# Patient Record
Sex: FEMALE | Race: WHITE | NOT HISPANIC OR LATINO | URBAN - METROPOLITAN AREA
[De-identification: names, ages, dates, MRNs, and addresses within clinical notes are randomized per-mention and may not be internally consistent; named-entity substitution may affect disease eponyms.]

---

## 2021-02-10 ENCOUNTER — OFFICE (OUTPATIENT)
Dept: URBAN - METROPOLITAN AREA CLINIC 108 | Facility: CLINIC | Age: 51
End: 2021-02-10

## 2021-02-10 VITALS
HEART RATE: 78 BPM | SYSTOLIC BLOOD PRESSURE: 140 MMHG | WEIGHT: 101 LBS | HEIGHT: 60 IN | DIASTOLIC BLOOD PRESSURE: 80 MMHG

## 2021-02-10 DIAGNOSIS — R10.13 EPIGASTRIC PAIN: ICD-10-CM

## 2021-02-10 DIAGNOSIS — R63.4 ABNORMAL WEIGHT LOSS: ICD-10-CM

## 2021-02-10 DIAGNOSIS — R13.19 OTHER DYSPHAGIA: ICD-10-CM

## 2021-02-10 DIAGNOSIS — K21.9 GASTRO-ESOPHAGEAL REFLUX DISEASE WITHOUT ESOPHAGITIS: ICD-10-CM

## 2021-02-10 PROCEDURE — 99203 OFFICE O/P NEW LOW 30 MIN: CPT | Performed by: NURSE PRACTITIONER

## 2021-04-29 ENCOUNTER — OFFICE (OUTPATIENT)
Dept: URBAN - METROPOLITAN AREA CLINIC 106 | Facility: CLINIC | Age: 51
End: 2021-04-29

## 2021-04-29 VITALS
DIASTOLIC BLOOD PRESSURE: 64 MMHG | HEIGHT: 60 IN | SYSTOLIC BLOOD PRESSURE: 122 MMHG | HEART RATE: 67 BPM | TEMPERATURE: 97.1 F | OXYGEN SATURATION: 99 % | WEIGHT: 99 LBS

## 2021-04-29 DIAGNOSIS — R10.13 EPIGASTRIC PAIN: ICD-10-CM

## 2021-04-29 DIAGNOSIS — R63.4 ABNORMAL WEIGHT LOSS: ICD-10-CM

## 2021-04-29 DIAGNOSIS — K21.9 GASTRO-ESOPHAGEAL REFLUX DISEASE WITHOUT ESOPHAGITIS: ICD-10-CM

## 2021-04-29 DIAGNOSIS — R13.19 OTHER DYSPHAGIA: ICD-10-CM

## 2021-04-29 PROCEDURE — 99214 OFFICE O/P EST MOD 30 MIN: CPT

## 2021-04-29 NOTE — SERVICEHPINOTES
Ana Maria Hanley   is seen today for a follow-up visit.     She returns today stating reflux worsened while taking iron tablets x 15 days, she stopped those and continued omeprazole and famotidine, now better. She would like to wait on EGD and call us when she is ready.

## 2021-10-21 ENCOUNTER — OFFICE (OUTPATIENT)
Dept: URBAN - METROPOLITAN AREA CLINIC 72 | Facility: CLINIC | Age: 51
End: 2021-10-21

## 2021-10-21 VITALS
HEART RATE: 83 BPM | DIASTOLIC BLOOD PRESSURE: 62 MMHG | RESPIRATION RATE: 14 BRPM | HEIGHT: 60 IN | WEIGHT: 99 LBS | SYSTOLIC BLOOD PRESSURE: 110 MMHG | TEMPERATURE: 98.2 F

## 2021-10-21 DIAGNOSIS — F45.8 OTHER SOMATOFORM DISORDERS: ICD-10-CM

## 2021-10-21 DIAGNOSIS — R14.0 ABDOMINAL DISTENSION (GASEOUS): ICD-10-CM

## 2021-10-21 DIAGNOSIS — R63.4 ABNORMAL WEIGHT LOSS: ICD-10-CM

## 2021-10-21 DIAGNOSIS — D50.9 IRON DEFICIENCY ANEMIA, UNSPECIFIED: ICD-10-CM

## 2021-10-21 DIAGNOSIS — E55.9 VITAMIN D DEFICIENCY, UNSPECIFIED: ICD-10-CM

## 2021-10-21 DIAGNOSIS — M35.00 SJOGREN SYNDROME, UNSPECIFIED: ICD-10-CM

## 2021-10-21 DIAGNOSIS — E53.8 DEFICIENCY OF OTHER SPECIFIED B GROUP VITAMINS: ICD-10-CM

## 2021-10-21 PROCEDURE — 99214 OFFICE O/P EST MOD 30 MIN: CPT | Performed by: INTERNAL MEDICINE

## 2021-10-21 RX ORDER — SODIUM PICOSULFATE, MAGNESIUM OXIDE, AND ANHYDROUS CITRIC ACID 10; 3.5; 12 MG/160ML; G/160ML; G/160ML
LIQUID ORAL
Qty: 1 | Refills: 0 | Status: COMPLETED
Start: 2021-10-21 | End: 2022-01-31

## 2021-10-21 NOTE — SERVICEHPINOTES
Ana Maria Hanley   is seen today for a follow-up visit.  
parul teran   She initially saw Mt 2/2021 
br She reports beginning in September 2020, she had swelling in her neck and throat and acid reflux symptoms. In October 2020, that became severe and persistent on a daily basis. She would have acid reflux daily, she would have mid chest dysphagia daily. She would have episodes of epigastric and chest pain after eating. She saw Dr. Femi HAMMER. She also had salivary gland swelling. He ordered CT of her neck. He did a laryngoscopy and found signs of acid reflux. He treated her with omeprazole 20 mg daily and famotidine 40 mg daily. She's been on that about 12 weeks now. She is better. Her reflux is better. Her dysphagia is rare now. She did lose 30 pounds last year because she had changed to an autoimmune diet because of her Sjogren's. Since September, she has lost some more weight. She also reports history of H. pylori in 2005 and was treated. She also brings in a CT from 2010 which shows a right diaphragmatic hernia.   She subsequently saw her in ?4/2021 as reflux got worse after starting iron.  When she stopped iron and started ppi and famotidine symptoms improved.  She declined EGD. br
parul teran SHe has had 2 bouts of this iit was October to april last year.  feeling of throat swelling and trouble swolling and acid reflux.  That cleared with medication (prilosec 20 and famotinde).  Was dong really well. She remained on those then started iron and symtposm got really bad.  Stopped iron and increase to nexium 40 and famotine 40 at night.  She has some epigastric tenderness that feels like a tight knot or pulled muscle.   She also has a RUQ pain and an US showed a fulid filled gb.  It doesn't seem to be worse after eating but certain posititions do seem to make it worse.  The dysphagia issue is better no throat swelling.  Hardly ever has heartburn.  
parul teran She has been told that her b12 and vitamin D was low.   Her iron was also low.  She still has periods.  She lost quite a bit of weight in october when this started (10 lbs) she had gained it back but now lost it again.  She was diagnosed with Sjogrens in 2005.  
parul teran She also notes episodic extremem bloating. Narda nurse has reviewed and updated the medication list with the patient (medication reconciliation). I have also reviewed the medication list. New updates were made to the patient's medical, social and family history. Pertinent details are also noted above in the HPI.br visited="true"

## 2021-10-21 NOTE — SERVICENOTES
Our goal is to partner with you to improve your health and well being. It is important for you to complete necessary testing and follow the instructions given to you at your clinic visit. Our office will call you within 2 weeks with results of any testing but you may also call sooner to obtain results [phone].   If you have any questions or concerns please feel free to call.  We take your care very seriously and we thank you for your trust!
- we will obtain results of US done at [place]
- labs today
- schedule EGD and colonscopy
- get SIBO breath test
- continue nexium 40 mg once daily 30 min before breakfast
- continue famotidine at night before bed
- follow up after testing

## 2021-11-10 ENCOUNTER — AMBULATORY SURGICAL CENTER (OUTPATIENT)
Dept: URBAN - METROPOLITAN AREA SURGERY 19 | Facility: SURGERY | Age: 51
End: 2021-11-10

## 2021-11-10 ENCOUNTER — OFFICE (OUTPATIENT)
Dept: URBAN - METROPOLITAN AREA PATHOLOGY 24 | Facility: PATHOLOGY | Age: 51
End: 2021-11-10

## 2021-11-10 DIAGNOSIS — D50.9 IRON DEFICIENCY ANEMIA, UNSPECIFIED: ICD-10-CM

## 2021-11-10 DIAGNOSIS — R13.10 DYSPHAGIA, UNSPECIFIED: ICD-10-CM

## 2021-11-10 LAB
COLONOSCOPY STUDY: (no result)
COLONOSCOPY STUDY: (no result)
RELEVANT H&P ENDOSCOPY: (no result)
RELEVANT H&P ENDOSCOPY: (no result)

## 2021-11-10 PROCEDURE — 45378 DIAGNOSTIC COLONOSCOPY: CPT | Performed by: INTERNAL MEDICINE

## 2021-11-10 PROCEDURE — 88305 TISSUE EXAM BY PATHOLOGIST: CPT | Performed by: INTERNAL MEDICINE

## 2021-11-10 PROCEDURE — 43239 EGD BIOPSY SINGLE/MULTIPLE: CPT | Performed by: INTERNAL MEDICINE

## 2022-01-31 ENCOUNTER — OFFICE (OUTPATIENT)
Dept: URBAN - METROPOLITAN AREA CLINIC 72 | Facility: CLINIC | Age: 52
End: 2022-01-31

## 2022-01-31 VITALS
OXYGEN SATURATION: 100 % | HEART RATE: 77 BPM | SYSTOLIC BLOOD PRESSURE: 118 MMHG | DIASTOLIC BLOOD PRESSURE: 70 MMHG | WEIGHT: 105 LBS | HEIGHT: 60 IN

## 2022-01-31 DIAGNOSIS — R14.0 ABDOMINAL DISTENSION (GASEOUS): ICD-10-CM

## 2022-01-31 DIAGNOSIS — D50.9 IRON DEFICIENCY ANEMIA, UNSPECIFIED: ICD-10-CM

## 2022-01-31 DIAGNOSIS — R10.13 EPIGASTRIC PAIN: ICD-10-CM

## 2022-01-31 DIAGNOSIS — K58.9 IRRITABLE BOWEL SYNDROME WITHOUT DIARRHEA: ICD-10-CM

## 2022-01-31 DIAGNOSIS — K21.9 GASTRO-ESOPHAGEAL REFLUX DISEASE WITHOUT ESOPHAGITIS: ICD-10-CM

## 2022-01-31 PROCEDURE — 99214 OFFICE O/P EST MOD 30 MIN: CPT | Performed by: INTERNAL MEDICINE

## 2022-01-31 RX ORDER — FAMOTIDINE 20 MG/1
TABLET, FILM COATED ORAL
Qty: 90 | Refills: 3 | Status: ACTIVE
Start: 2022-01-31

## 2022-01-31 RX ORDER — RIFAXIMIN 550 MG/1
1650 TABLET ORAL
Qty: 42 | Refills: 0 | Status: ACTIVE
Start: 2022-01-31

## 2022-01-31 NOTE — SERVICENOTES
Our goal is to partner with you to improve your health and well being. It is important for you to complete necessary testing and follow the instructions given to you at your clinic visit. Our office will call you within 2 weeks with results of any testing but you may also call sooner to obtain results [phone].   If you have any questions or concerns please feel free to call.  We take your care very seriously and we thank you for your trust!
-  we will obtain results of the US
- continue vit D 1000 a day and I will let you know if you have to adjust
- stop famotidine 40 and decrease to 20 (new prescription in the pharmacy).  You can save the 40 to take as needed for breakthrough heartburn
- get labs
- , Take Xifaxan 1 pill three times a day for 14 days, if you have an issue with your pharmacy with this medication please let us know
- follow up in 3 months

## 2022-01-31 NOTE — SERVICEHPINOTES
Ana Maria Hanley   is seen today for a follow-up visit.  
parul Landin initially saw Mt 2/2021brSmagy reports beginning in September 2020, she had swelling in her neck and throat and acid reflux symptoms. In October 2020, that became severe and persistent on a daily basis. She would have acid reflux daily, she would have mid chest dysphagia daily. She would have episodes of epigastric and chest pain after eating. She saw Dr. Femi HAMMER. She also had salivary gland swelling. He ordered CT of her neck. He did a laryngoscopy and found signs of acid reflux. He treated her with omeprazole 20 mg daily and famotidine 40 mg daily. She's been on that about 12 weeks now. She is better. Her reflux is better. Her dysphagia is rare now. She did lose 30 pounds last year because she had changed to an autoimmune diet because of her Sjogren's. Since September, she has lost some more weight. She also reports history of H. pylori in 2005 and was treated. She also brings in a CT from 2010 which shows a right diaphragmatic hernia.She subsequently saw her in?4/2021 as reflux got worse after starting iron. When she stopped iron and started ppi and famotidine symptoms improved. She declined EGD. Interval History 10/2021brSMagy has had 2 bouts of this iit was October to april last year. feeling of throat swelling and trouble swelling and acid reflux. That cleared with medication (prilosec 20 and famotinde). Was dong really well. She remained on those then started iron and symptoms got really bad. Stopped iron and increase to nexium 40 and famotine 40 at night. She has some epigastric tenderness that feels like a tight knot or pulled muscle. She also has a RUQ pain and an US showed a fulid filled gb. It doesn't seem to be worse after eating but certain positions do seem to make it worse. The dysphagia issue is better no throat swelling. Hardly ever has heartburn. She has been told that her b12 and vitamin D was low. Her iron was also low. She still has periods. She lost quite a bit of weight in october when this started (10 lbs) she had gained it back but now lost it again. She was diagnosed with Sjogrens in 2005. She also notes episodic extreme bloating.    br  Plan from last visit:
br
br labs todaybr- schedule EGD and colonoscopybr- get SIBO breath testbr- continue nexium 40 mg once daily 30 min before breakfastbr- continue famotidine at night before bedbr- follow up after testing Interval History:  1/31/2022   
br   Labs show WILLIAM with sat 4%, ferritin of 3 as well as b12 deficiency to 133 and vitamin D deficiency to 24?
br I referred her to heme/onc, they recommended IV iron and she received a dose as well as oral b12. 
br She has also been getting b12 shots.  
br The epigastric pain is better still there, feels like a muscle or knot, pain to the right is improved. 
br She is still having a lot of bloating and tightness.  That is after eating, that is maybe a bit better. 
br She had one b12 shot last time with iron infusion.  Repeated b12 and told it was too low still so they are going to start every 2 weeks shot
br ALso told that WBC is a bit low so they are tracking that. 
brShe was constipated for quite a while and now that is a little better. 
br She is taking 40 and famotidine 40, she is not having much acid at all. br
br She is taking 1000 iu a day of vitamin D daily
br
br She had a pelvic US not too long again in October, That was through her GYN at [facility]. She was told that she had a large fibroid.  She follows Dr. Reyes, she did not have an endometrial biopsy
br
br weight has improved but stablebrMy nurse has reviewed and updated the medication list with the patient (medication reconciliation). I have also reviewed the medication list. New updates were made to the patient's medical, social and family history. Pertinent details are also noted above in the HPI.br visited="true"

## 2022-04-21 ENCOUNTER — OFFICE (OUTPATIENT)
Dept: URBAN - METROPOLITAN AREA CLINIC 72 | Facility: CLINIC | Age: 52
End: 2022-04-21

## 2022-04-21 VITALS
HEIGHT: 60 IN | OXYGEN SATURATION: 99 % | WEIGHT: 106 LBS | HEART RATE: 67 BPM | DIASTOLIC BLOOD PRESSURE: 68 MMHG | SYSTOLIC BLOOD PRESSURE: 118 MMHG

## 2022-04-21 DIAGNOSIS — R10.13 EPIGASTRIC PAIN: ICD-10-CM

## 2022-04-21 DIAGNOSIS — B96.81 HELICOBACTER PYLORI [H. PYLORI] AS THE CAUSE OF DISEASES CLA: ICD-10-CM

## 2022-04-21 PROCEDURE — 99214 OFFICE O/P EST MOD 30 MIN: CPT | Performed by: SPECIALIST

## 2022-05-12 LAB — H. PYLORI STOOL AG, EIA: NEGATIVE

## 2022-06-29 ENCOUNTER — OFFICE (OUTPATIENT)
Dept: URBAN - METROPOLITAN AREA CLINIC 84 | Facility: CLINIC | Age: 52
End: 2022-06-29

## 2022-06-29 VITALS
OXYGEN SATURATION: 99 % | WEIGHT: 101 LBS | SYSTOLIC BLOOD PRESSURE: 110 MMHG | HEART RATE: 68 BPM | HEIGHT: 60 IN | DIASTOLIC BLOOD PRESSURE: 78 MMHG

## 2022-06-29 DIAGNOSIS — R10.13 EPIGASTRIC PAIN: ICD-10-CM

## 2022-06-29 PROCEDURE — 99214 OFFICE O/P EST MOD 30 MIN: CPT | Performed by: SPECIALIST

## 2022-06-29 RX ORDER — ESOMEPRAZOLE MAGNESIUM 40 MG/1
CAPSULE, DELAYED RELEASE ORAL
Qty: 30 | Refills: 3 | Status: ACTIVE
Start: 2022-06-29

## 2022-09-12 ENCOUNTER — OFFICE (OUTPATIENT)
Dept: URBAN - METROPOLITAN AREA CLINIC 72 | Facility: CLINIC | Age: 52
End: 2022-09-12

## 2022-09-12 VITALS
DIASTOLIC BLOOD PRESSURE: 70 MMHG | SYSTOLIC BLOOD PRESSURE: 110 MMHG | HEART RATE: 66 BPM | OXYGEN SATURATION: 99 % | WEIGHT: 97 LBS | HEIGHT: 60 IN

## 2022-09-12 DIAGNOSIS — K21.9 GASTRO-ESOPHAGEAL REFLUX DISEASE WITHOUT ESOPHAGITIS: ICD-10-CM

## 2022-09-12 DIAGNOSIS — M35.00 SJOGREN SYNDROME, UNSPECIFIED: ICD-10-CM

## 2022-09-12 DIAGNOSIS — B96.81 HELICOBACTER PYLORI [H. PYLORI] AS THE CAUSE OF DISEASES CLA: ICD-10-CM

## 2022-09-12 DIAGNOSIS — E53.8 DEFICIENCY OF OTHER SPECIFIED B GROUP VITAMINS: ICD-10-CM

## 2022-09-12 DIAGNOSIS — E55.9 VITAMIN D DEFICIENCY, UNSPECIFIED: ICD-10-CM

## 2022-09-12 DIAGNOSIS — R63.4 ABNORMAL WEIGHT LOSS: ICD-10-CM

## 2022-09-12 DIAGNOSIS — R14.0 ABDOMINAL DISTENSION (GASEOUS): ICD-10-CM

## 2022-09-12 PROCEDURE — 99214 OFFICE O/P EST MOD 30 MIN: CPT | Performed by: INTERNAL MEDICINE

## 2022-09-12 RX ORDER — ESOMEPRAZOLE MAGNESIUM 20 MG/1
CAPSULE, DELAYED RELEASE ORAL
Qty: 90 | Refills: 3 | Status: ACTIVE
Start: 2022-09-12

## 2022-09-12 RX ORDER — SUCRALFATE 1 G/1
TABLET ORAL
Qty: 120 | Refills: 3 | Status: ACTIVE
Start: 2022-09-12

## 2022-09-12 NOTE — SERVICENOTES
Our goal is to partner with you to improve your health and well being. It is important for you to complete necessary testing and follow the instructions given to you at your clinic visit. Our office will call you within 2 weeks with results of any testing but you may also call sooner to obtain results [phone].   If you have any questions or concerns please feel free to call.  We take your care very seriously and we thank you for your trust!
- stop the nexium for at leats 4 days and repeat the stool test for HP 
- when you start back I would like to try the 20 mg dose and see how you do
- sánchez's tea is another good option for bloating
- trial of sucrualfate (carafate) 1-4 times a day. If the pills is too big you can make a slurry, place 1 sucralfate pill in about 1 ounce of water and let sit for 10 min then stir.  This will create a slurry, take it 2-4  times a day as needed for reflux, esophageal symptoms, upper abdominal pain..  Try to separate from other medications by 2 hours.
- please start vitamin D3 1000 iu a day
- try to add high calorie snacks like nut butter, avocado, protein drinks
- try restarting your pilocarpine as saliva helps neutralize acid, improves digestion
- follow up in 6 weeks

## 2022-09-12 NOTE — SERVICEHPINOTES
Ana Maria Hanley   is seen today for a follow-up visit.  
parul Landin initially saw Mt 2/2021brSmagy reports beginning in September 2020, she had swelling in her neck and throat and acid reflux symptoms. In October 2020, that became severe and persistent on a daily basis. She would have acid reflux daily, she would have mid chest dysphagia daily. She would have episodes of epigastric and chest pain after eating. She saw Dr. Femi HAMMER. She also had salivary gland swelling. He ordered CT of her neck. He did a laryngoscopy and found signs of acid reflux. He treated her with omeprazole 20 mg daily and famotidine 40 mg daily. She's been on that about 12 weeks now. She is better. Her reflux is better. Her dysphagia is rare now. She did lose 30 pounds last year because she had changed to an autoimmune diet because of her Sjogren's. Since September, she has lost some more weight. She also reports history of H. pylori in 2005 and was treated. She also brings in a CT from 2010 which shows a right diaphragmatic hernia.She subsequently saw her in?4/2021 as reflux got worse after starting iron. When she stopped iron and started ppi and famotidine symptoms improved. She declined EGD.Interval History 10/2021brSMagy has had 2 bouts of this iit was October to april last year. feeling of throat swelling and trouble swelling and acid reflux. That cleared with medication (prilosec 20 and famotinde). Was dong really well. She remained on those then started iron and symptoms got really bad. Stopped iron and increase to nexium 40 and famotine 40 at night. She has some epigastric tenderness that feels like a tight knot or pulled muscle. She also has a RUQ pain and an US showed a fulid filled gb. It doesn't seem to be worse after eating but certain positions do seem to make it worse. The dysphagia issue is better no throat swelling. Hardly ever has heartburn.She has been told that her b12 and vitamin D was low. Her iron was also low. She still has periods. She lost quite a bit of weight in october when this started (10 lbs) she had gained it back but now lost it again. She was diagnosed with Sjogrens in 2005.She also notes episodic extreme bloating.[place] from last visit:labs todaybr- schedule EGD and colonoscopybr- get SIBO breath testbr- continue nexium 40 mg once daily 30 min before breakfastbr- continue famotidine at night before bedbr- follow up after testingInterval History:1/31/2022brLabs show WILLIAM with sat 4%, ferritin of 3 as well as b12 deficiency to 133 and vitamin D deficiency to 24?Salomón referred her to heme/onc, they recommended IV iron and she received a dose as well as oral b12. Urvashi has also been getting b12 shots. brThe epigastric pain is better still there, feels like a muscle or knot, pain to the right is improved. Urvashi is still having a lot of bloating and tightness. That is after eating, that is maybe a bit better. Urvashi had one b12 shot last time with iron infusion. Repeated b12 and told it was too low still so they are going to start every 2 weeks shotbrALso told that WBC is a bit low so they are tracking that. Urvashi was constipated for quite a while and now that is a little better. Urvashi is taking 40 and famotidine 40, she is not having much acid at all. She is taking 1000 iu a day of vitamin D dailyShbecky had a pelvic US not too long again in October, That was through her GYN at [facility]. She was told that she had a large fibroid. She follows Dr. Reyes, she did not have an endometrial biopsyweight has improved but stable    br  
br She saw Dr. Hernández in 4/22 adn 6/22brhe HP was positives
br she was treated and follow up stool test was negative
br She was treated wtih xifaxin and did not feel that it helped. br She has also been seen by [place] [facility] for b12 injectionsbrShe was doing a lot better after the HP was treated and cleared.  Wasn't 100% so tried the xifaxin that seemed to make things worse.  Bloating and tightness in upper abdomen is worse, affects breathing and feels like it is pulling.  Some aching under ribs.  Started back on nexium and align in the last 10 weeks.  
br The nexium has helped the acid reflux symptoms.  She is still having some acid at times and upper abdominal pressure. 
br
br last week she had 2 episodes of blood in the stool and a bit when she wiped.  I saw picutre and it was streaked on the outside.  My nurse has reviewed and updated the medication list with the patient (medication reconciliation). I have also reviewed the medication list. New updates were made to the patient's medical, social and family history. Pertinent details are also noted above in the HPI.br visited="true"

## 2022-10-03 ENCOUNTER — APPOINTMENT (OUTPATIENT)
Dept: URBAN - METROPOLITAN AREA SURGERY 12 | Age: 52
Setting detail: DERMATOLOGY
End: 2022-10-03

## 2022-10-03 DIAGNOSIS — L81.4 OTHER MELANIN HYPERPIGMENTATION: ICD-10-CM

## 2022-10-03 DIAGNOSIS — L63.8 OTHER ALOPECIA AREATA: ICD-10-CM

## 2022-10-03 DIAGNOSIS — D18.0 HEMANGIOMA: ICD-10-CM

## 2022-10-03 DIAGNOSIS — L82.1 OTHER SEBORRHEIC KERATOSIS: ICD-10-CM

## 2022-10-03 DIAGNOSIS — D22 MELANOCYTIC NEVI: ICD-10-CM

## 2022-10-03 PROBLEM — D48.5 NEOPLASM OF UNCERTAIN BEHAVIOR OF SKIN: Status: ACTIVE | Noted: 2022-10-03

## 2022-10-03 PROBLEM — D18.01 HEMANGIOMA OF SKIN AND SUBCUTANEOUS TISSUE: Status: ACTIVE | Noted: 2022-10-03

## 2022-10-03 PROBLEM — D22.5 MELANOCYTIC NEVI OF TRUNK: Status: ACTIVE | Noted: 2022-10-03

## 2022-10-03 PROCEDURE — 11102 TANGNTL BX SKIN SINGLE LES: CPT

## 2022-10-03 PROCEDURE — 99203 OFFICE O/P NEW LOW 30 MIN: CPT | Mod: 25

## 2022-10-03 PROCEDURE — OTHER BIOPSY BY SHAVE METHOD: OTHER

## 2022-10-03 PROCEDURE — OTHER COUNSELING: OTHER

## 2022-10-03 ASSESSMENT — LOCATION SIMPLE DESCRIPTION DERM
LOCATION SIMPLE: ANTERIOR SCALP
LOCATION SIMPLE: CHEST
LOCATION SIMPLE: POSTERIOR NECK
LOCATION SIMPLE: ABDOMEN

## 2022-10-03 ASSESSMENT — LOCATION ZONE DERM
LOCATION ZONE: TRUNK
LOCATION ZONE: NECK
LOCATION ZONE: SCALP

## 2022-10-03 ASSESSMENT — LOCATION DETAILED DESCRIPTION DERM
LOCATION DETAILED: MIDDLE STERNUM
LOCATION DETAILED: XIPHOID
LOCATION DETAILED: LEFT INFERIOR POSTERIOR NECK
LOCATION DETAILED: MID-FRONTAL SCALP
LOCATION DETAILED: LOWER STERNUM

## 2022-10-03 NOTE — HPI: HAIR LOSS
How Severe Is Your Hair Loss?: mild
Additional History: Pt has had hx of hair loss in the past. Pt was recently dxd with H. Pylori. She states she noticed hair loss not long after

## 2022-10-03 NOTE — PROCEDURE: COUNSELING
Detail Level: Generalized
Detail Level: Zone
Patient Specific Counseling (Will Not Stick From Patient To Patient): Discussed [place] Kenalog but pt declined today. Will call back if she wishes to proceed

## 2022-10-31 ENCOUNTER — OFFICE (OUTPATIENT)
Dept: URBAN - METROPOLITAN AREA CLINIC 72 | Facility: CLINIC | Age: 52
End: 2022-10-31

## 2022-10-31 VITALS
OXYGEN SATURATION: 99 % | HEART RATE: 89 BPM | HEIGHT: 60 IN | SYSTOLIC BLOOD PRESSURE: 132 MMHG | DIASTOLIC BLOOD PRESSURE: 80 MMHG | WEIGHT: 100 LBS

## 2022-10-31 DIAGNOSIS — Z13.820 ENCOUNTER FOR SCREENING FOR OSTEOPOROSIS: ICD-10-CM

## 2022-10-31 DIAGNOSIS — D50.9 IRON DEFICIENCY ANEMIA, UNSPECIFIED: ICD-10-CM

## 2022-10-31 DIAGNOSIS — M35.00 SJOGREN SYNDROME, UNSPECIFIED: ICD-10-CM

## 2022-10-31 DIAGNOSIS — B96.81 HELICOBACTER PYLORI [H. PYLORI] AS THE CAUSE OF DISEASES CLA: ICD-10-CM

## 2022-10-31 DIAGNOSIS — E53.8 DEFICIENCY OF OTHER SPECIFIED B GROUP VITAMINS: ICD-10-CM

## 2022-10-31 DIAGNOSIS — R14.0 ABDOMINAL DISTENSION (GASEOUS): ICD-10-CM

## 2022-10-31 DIAGNOSIS — E55.9 VITAMIN D DEFICIENCY, UNSPECIFIED: ICD-10-CM

## 2022-10-31 PROCEDURE — 99214 OFFICE O/P EST MOD 30 MIN: CPT | Performed by: INTERNAL MEDICINE

## 2022-10-31 RX ORDER — SUCRALFATE 1 G/1
TABLET ORAL
Qty: 120 | Refills: 3 | Status: ACTIVE
Start: 2022-09-12

## 2022-10-31 NOTE — SERVICEHPINOTES
Ana Maria Hanley   is seen today for a follow-up visit. 
teresa initially saw Mt 2/2021brSmagy reports beginning in September 2020, she had swelling in her neck and throat and acid reflux symptoms. In October 2020, that became severe and persistent on a daily basis. She would have acid reflux daily, she would have mid chest dysphagia daily. She would have episodes of epigastric and chest pain after eating. She saw Dr. Femi HAMMER. She also had salivary gland swelling. He ordered CT of her neck. He did a laryngoscopy and found signs of acid reflux. He treated her with omeprazole 20 mg daily and famotidine 40 mg daily. She's been on that about 12 weeks now. She is better. Her reflux is better. Her dysphagia is rare now. She did lose 30 pounds last year because she had changed to an autoimmune diet because of her Sjogren's. Since September, she has lost some more weight. She also reports history of H. pylori in 2005 and was treated. She also brings in a CT from 2010 which shows a right diaphragmatic hernia.She subsequently saw her in?4/2021 as reflux got worse after starting iron. When she stopped iron and started ppi and famotidine symptoms improved. She declined EGD.Interval History 10/2021brSMagy has had 2 bouts of this iit was October to april last year. feeling of throat swelling and trouble swelling and acid reflux. That cleared with medication (prilosec 20 and famotinde). Was dong really well. She remained on those then started iron and symptoms got really bad. Stopped iron and increase to nexium 40 and famotine 40 at night. She has some epigastric tenderness that feels like a tight knot or pulled muscle. She also has a RUQ pain and an US showed a fulid filled gb. It doesn't seem to be worse after eating but certain positions do seem to make it worse. The dysphagia issue is better no throat swelling. Hardly ever has heartburn.She has been told that her b12 and vitamin D was low. Her iron was also low. She still has periods. She lost quite a bit of weight in october when this started (10 lbs) she had gained it back but now lost it again. She was diagnosed with Sjogrens in 2005.She also notes episodic extreme bloating.brInterval History:1/31/2022brLabs show WILLIAM with sat 4%, ferritin of 3 as well as b12 deficiency to 133 and vitamin D deficiency to 24?Salomón referred her to heme/onc, they recommended IV iron and she received a dose as well as oral b12.Urvashi has also been getting b12 shots.brThe epigastric pain is better still there, feels like a muscle or knot, pain to the right is improved.Urvashi is still having a lot of bloating and tightness. That is after eating, that is maybe a bit better.Urvashi had one b12 shot last time with iron infusion. Repeated b12 and told it was too low still so they are going to start every 2 weeks shotbrALso told that WBC is a bit low so they are tracking that.Urvashi was constipated for quite a while and now that is a little better.Urvashi is taking 40 and famotidine 40, she is not having much acid at all.She is taking 1000 iu a day of vitamin D dailyFrida had a pelvic US not too long again in October, That was through her GYN at [facility]. She was told that she had a large fibroid. She follows Dr. Reyes, she did not have an endometrial biopsyweight has improved but Robles saw Dr. Hernández in 4/22 adn 6/22brhe HP was positivesbrfrida was treated and follow up stool test was negativeUrvashi was treated wtih xifaxin and did not feel that it helped. Urvashi has also been seen by [place] [facility] for b12 injectionsbrFrida was doing a lot better after the HP was treated and cleared. Wasn't 100% so tried the xifaxin that seemed to make things worse. Bloating and tightness in upper abdomen is worse, affects breathing and feels like it is pulling. Some aching under ribs. Started back on nexium and align in the last 10 weeks. brThe nexium has helped the acid reflux symptoms. She is still having some acid at times and upper abdominal pressure. last week she had 2 episodes of blood in the stool and a bit when she wiped. I saw picutre and it was streaked on the outside.    br  Plan from last visit:
br
br- stop the nexium for at leats 4 days and repeat the stool test for HP br- when you start back I would like to try the 20 mg dose and see how you dobr- sánchez's tea is another good option for bloatingbr- trial of sucrualfate (carafate) 1-4 times a day. If the pills is too big you can make a slurry, place 1 sucralfate pill in about 1 ounce of water and let sit for 10 min then stir. This will create a slurry, take it 2-4 times a day as needed for reflux, esophageal symptoms, upper abdominal pain.. Try to separate from other medications by 2 hours.br- please start vitamin D3 1000 iu a daybr- try to add high calorie snacks like nut butter, avocado, protein drinksbr- try restarting your pilocarpine as saliva helps neutralize acid, improves digestionbr- follow up in 6 weeks Interval History:  10/31/2022   
br   She did not have the HP stool test done
br weight is stable or up a bit
br She delayed her HP stool
br She decreased marcos nexium from 40 to 20.  
br She is taking the carafate and felt like it was helping but the nexium seemed to be cuasing some lower abdominal pain and when she stopped it in th elast couple dayus she felt better. 
br 8/5 she had an iron infusion and she gets monthly b12 injections. 
br BM are normal. 
br swallowing is fine. 
br She is seeing her GYN 12/1, gyn was not worried about the fibroid in her uterus.  br
br My nurse has reviewed and updated the medication list with the patient (medication reconciliation). I have also reviewed the medication list. New updates were made to the patient's medical, social and family history. Pertinent details are also noted above in the HPI.br visited="true"

## 2022-10-31 NOTE — SERVICENOTES
Our goal is to partner with you to improve your health and well being. It is important for you to complete necessary testing and follow the instructions given to you at your clinic visit. Our office will call you within 2 weeks with results of any testing but you may also call sooner to obtain results [phone].   If you have any questions or concerns please feel free to call.  We take your care very seriously and we thank you for your trust!
- schedule dexa
- conitnue carafate up to 3 times a day.  Stop it for a day or two prior to collecting stool for HP

## 2023-05-30 ENCOUNTER — OFFICE (OUTPATIENT)
Dept: URBAN - METROPOLITAN AREA CLINIC 72 | Facility: CLINIC | Age: 53
End: 2023-05-30
Payer: COMMERCIAL

## 2023-05-30 VITALS
HEIGHT: 60 IN | WEIGHT: 116 LBS | HEART RATE: 55 BPM | DIASTOLIC BLOOD PRESSURE: 65 MMHG | SYSTOLIC BLOOD PRESSURE: 105 MMHG | OXYGEN SATURATION: 99 %

## 2023-05-30 DIAGNOSIS — R14.0 ABDOMINAL DISTENSION (GASEOUS): ICD-10-CM

## 2023-05-30 DIAGNOSIS — E55.9 VITAMIN D DEFICIENCY, UNSPECIFIED: ICD-10-CM

## 2023-05-30 DIAGNOSIS — E53.8 DEFICIENCY OF OTHER SPECIFIED B GROUP VITAMINS: ICD-10-CM

## 2023-05-30 DIAGNOSIS — K21.9 GASTRO-ESOPHAGEAL REFLUX DISEASE WITHOUT ESOPHAGITIS: ICD-10-CM

## 2023-05-30 DIAGNOSIS — Z80.0 FAMILY HISTORY OF MALIGNANT NEOPLASM OF DIGESTIVE ORGANS: ICD-10-CM

## 2023-05-30 PROCEDURE — 99214 OFFICE O/P EST MOD 30 MIN: CPT | Performed by: INTERNAL MEDICINE

## 2023-05-30 RX ORDER — FAMOTIDINE 40 MG/1
TABLET, FILM COATED ORAL
Qty: 90 | Refills: 3 | Status: COMPLETED
Start: 2023-05-30 | End: 2023-08-30

## 2023-05-30 RX ORDER — ERGOCALCIFEROL 1.25 MG/1
CAPSULE, LIQUID FILLED ORAL
Qty: 12 | Refills: 2 | Status: COMPLETED
Start: 2023-05-30 | End: 2023-08-30

## 2023-05-30 NOTE — SERVICENOTES
Our goal is to partner with you to improve your health and well being. It is important for you to complete necessary testing and follow the instructions given to you at your clinic visit. Our office will call you within 2 weeks with results of any testing but you may also call sooner to obtain results [phone].   If you have any questions or concerns please feel free to call.  We take your care very seriously and we thank you for your trust!]
- continue once a week 50,000 iu vitamin D
- when abdomen bloats try taking peppermint and FD guard (delia).  you can also take some gas-x and activate charcol if needed
- stool test
- we will get labs from your pcp (recent vitamin D)
- we will also request labs from [facility] (most recent) 
- famotidine 40 mg 1-2 times a day as needed for reflux symptoms

## 2023-05-30 NOTE — SERVICEHPINOTES
Ana Maria Hanley   is seen today for a follow-up visit.  
parul moore initially saw Mt 2/2021brSmagy reports beginning in September 2020, she had swelling in her neck and throat and acid reflux symptoms. In October 2020, that became severe and persistent on a daily basis. She would have acid reflux daily, she would have mid chest dysphagia daily. She would have episodes of epigastric and chest pain after eating. She saw Dr. Femi HAMMER. She also had salivary gland swelling. He ordered CT of her neck. He did a laryngoscopy and found signs of acid reflux. He treated her with omeprazole 20 mg daily and famotidine 40 mg daily. She's been on that about 12 weeks now. She is better. Her reflux is better. Her dysphagia is rare now. She did lose 30 pounds last year because she had changed to an autoimmune diet because of her Sjogren's. Since September, she has lost some more weight. She also reports history of H. pylori in 2005 and was treated. She also brings in a CT from 2010 which shows a right diaphragmatic hernia.She subsequently saw her in?4/2021 as reflux got worse after starting iron. When she stopped iron and started ppi and famotidine symptoms improved. She declined EGD.Interval History 10/2021brSMagy has had 2 bouts of this iit was October to april last year. feeling of throat swelling and trouble swelling and acid reflux. That cleared with medication (prilosec 20 and famotinde). Was dong really well. She remained on those then started iron and symptoms got really bad. Stopped iron and increase to nexium 40 and famotine 40 at night. She has some epigastric tenderness that feels like a tight knot or pulled muscle. She also has a RUQ pain and an US showed a fulid filled gb. It doesn't seem to be worse after eating but certain positions do seem to make it worse. The dysphagia issue is better no throat swelling. Hardly ever has heartburn.She has been told that her b12 and vitamin D was low. Her iron was also low. She still has periods. She lost quite a bit of weight in october when this started (10 lbs) she had gained it back but now lost it again. She was diagnosed with Sjogrens in 2005.She also notes episodic extreme bloating.brInterval History:1/31/2022brLabs show WILLIAM with sat 4%, ferritin of 3 as well as b12 deficiency to 133 and vitamin D deficiency to 24?[facility] referred her to heme/onc, they recommended IV iron and she received a dose as well as oral b12.Urvashi has also been getting b12 shots.brThe epigastric pain is better still there, feels like a muscle or knot, pain to the right is improved.Urvashi is still having a lot of bloating and tightness. That is after eating, that is maybe a bit better.Urvashi had one b12 shot last time with iron infusion. Repeated b12 and told it was too low still so they are going to start every 2 weeks shotbrALso told that WBC is a bit low so they are tracking that.Urvashi was constipated for quite a while and now that is a little better.Urvashi is taking 40 and famotidine 40, she is not having much acid at all.She is taking 1000 iu a day of vitamin D dailyFrida had a pelvic US not too long again in October, That was through her GYN at [facility]. She was told that she had a large fibroid. She follows Dr. Reyes, she did not have an endometrial biopsyweight has improved but Robles saw Dr. Hernández in 4/22 adn 6/22brhe HP was positivesbrshbecky was treated and follow up stool test was negativebrFrida was treated wtih xifaxin and did not feel that it helped.Urvashi has also been seen by [place] [facility] for b12 injectionsbrFrida was doing a lot better after the HP was treated and cleared. Wasn't 100% so tried the xifaxin that seemed to make things worse. Bloating and tightness in upper abdomen is worse, affects breathing and feels like it is pulling. Some aching under ribs. Started back on nexium and align in the last 10 weeks.brThe nexium has helped the acid reflux symptoms. She is still having some acid at times and upper abdominal pressure.last week she had 2 episodes of blood in the stool and a bit when she wiped. I saw picutre and it was streaked on the outside.brInterval History:10/31/2022brSmagy did not have the HP stool test donebrweight is stable or up a bitbrShe delayed her HP stoolbrShe decreased marcos nexium from 40 to 20. brShe is taking the carafate and felt like it was helping but the nexium seemed to be cuasing some lower abdominal pain and when she stopped it in th elast couple dayus she felt better. br8/5 she had an iron infusion and she gets monthly b12 injections. brBM are normal. brswallowing is fine. brShe is seeing her GYN 12/1, gyn was not worried about the fibroid in her uterus. br    br  Plan from last visit:
br- schedule dexabr- conitnue carafate up to 3 times a day. Stop it for a day or two prior to collecting stool for HP Interval History:  5/30/2023   
br   vit D low *17) started on weekly
br HP stool neg
br DExA wtih osteoporosis
br
br She has been off nexium for 3 months and feels like overall better. 
br She has intermittent bloating that is not as severe as when she had the H pylori, 
brHer vitamin D is in the normal range.  She has been on the 50,000 IU , SHe tried back on the OTC and had a little stomach pain. Occasionally she had a little acid.  br
She has had iron infusions nad monthly b12br She doesnt have a period since November.  
br She is also seeing rheumatologyMy nurse has reviewed and updated the medication list with the patient (medication reconciliation). I have also reviewed the medication list. New updates were made to the patient's medical, social and family history. Pertinent details are also noted above in the HPI.br visited="true"

## 2023-10-23 ENCOUNTER — OFFICE (OUTPATIENT)
Dept: URBAN - METROPOLITAN AREA CLINIC 72 | Facility: CLINIC | Age: 53
End: 2023-10-23

## 2023-10-23 VITALS
HEIGHT: 60 IN | WEIGHT: 116 LBS | DIASTOLIC BLOOD PRESSURE: 78 MMHG | HEART RATE: 66 BPM | SYSTOLIC BLOOD PRESSURE: 122 MMHG

## 2023-10-23 DIAGNOSIS — M35.00 SJOGREN SYNDROME, UNSPECIFIED: ICD-10-CM

## 2023-10-23 DIAGNOSIS — R10.13 EPIGASTRIC PAIN: ICD-10-CM

## 2023-10-23 DIAGNOSIS — M81.0 AGE-RELATED OSTEOPOROSIS WITHOUT CURRENT PATHOLOGICAL FRACTU: ICD-10-CM

## 2023-10-23 DIAGNOSIS — E55.9 VITAMIN D DEFICIENCY, UNSPECIFIED: ICD-10-CM

## 2023-10-23 DIAGNOSIS — R14.0 ABDOMINAL DISTENSION (GASEOUS): ICD-10-CM

## 2023-10-23 DIAGNOSIS — E53.8 DEFICIENCY OF OTHER SPECIFIED B GROUP VITAMINS: ICD-10-CM

## 2023-10-23 PROCEDURE — 99214 OFFICE O/P EST MOD 30 MIN: CPT | Performed by: INTERNAL MEDICINE

## 2023-10-23 RX ORDER — PREDNISONE 10 MG/1
TABLET ORAL
Qty: 25 | Refills: 1 | Status: ACTIVE
Start: 2023-10-23

## 2023-10-23 NOTE — SERVICEHPINOTES
Ana Maria Hanley   is seen today for a follow-up visit.  
brinitially saw Mt 2/2021brShe reports beginning in September 2020, she had swelling in her neck and throat and acid reflux symptoms. In October 2020, that became severe and persistent on a daily basis. She would have acid reflux daily, she would have mid chest dysphagia daily. She would have episodes of epigastric and chest pain after eating. She saw Dr. Femi HAMMER. She also had salivary gland swelling. He ordered CT of her neck. He did a laryngoscopy and found signs of acid reflux. He treated her with omeprazole 20 mg daily and famotidine 40 mg daily. She's been on that about 12 weeks now. She is better. Her reflux is better. Her dysphagia is rare now. She did lose 30 pounds last year because she had changed to an autoimmune diet because of her Sjogren's. Since September, she has lost some more weight. She also reports history of H. pylori in 2005 and was treated. She also brings in a CT from 2010 which shows a right diaphragmatic hernia.She subsequently saw her in?4/2021 as reflux got worse after starting iron. When she stopped iron and started ppi and famotidine symptoms improved. She declined EGD.Interval History 10/2021brSMagy has had 2 bouts of this iit was October to april last year. feeling of throat swelling and trouble swelling and acid reflux. That cleared with medication (prilosec 20 and famotinde). Was dong really well. She remained on those then started iron and symptoms got really bad. Stopped iron and increase to nexium 40 and famotine 40 at night. She has some epigastric tenderness that feels like a tight knot or pulled muscle. She also has a RUQ pain and an US showed a fulid filled gb. It doesn't seem to be worse after eating but certain positions do seem to make it worse. The dysphagia issue is better no throat swelling. Hardly ever has heartburn.She has been told that her b12 and vitamin D was low. Her iron was also low. She still has periods. She lost quite a bit of weight in october when this started (10 lbs) she had gained it back but now lost it again. She was diagnosed with Sjogrens in 2005.She also notes episodic extreme bloating.brInterval History:1/31/2022brLabs show WILLIAM with sat 4%, ferritin of 3 as well as b12 deficiency to 133 and vitamin D deficiency to 24?[facility] referred her to heme/onc, they recommended IV iron and she received a dose as well as oral b12.Urvashi has also been getting b12 shots.brThe epigastric pain is better still there, feels like a muscle or knot, pain to the right is improved.Urvashi is still having a lot of bloating and tightness. That is after eating, that is maybe a bit better.Urvashi had one b12 shot last time with iron infusion. Repeated b12 and told it was too low still so they are going to start every 2 weeks shotbrALso told that WBC is a bit low so they are tracking that.Urvashi was constipated for quite a while and now that is a little better.Urvashi is taking 40 and famotidine 40, she is not having much acid at all.She is taking 1000 iu a day of vitamin D dailyFrida had a pelvic US not too long again in October, That was through her GYN at [facility] [place]. She was told that she had a large fibroid. She follows Dr. Reyes, she did not have an endometrial biopsyweight has improved but Robles saw Dr. Hernández in 4/22 adn 6/22brhe HP was positivesbrshbecky was treated and follow up stool test was negativebrFrida was treated wtih xifaxin and did not feel that it helped.Urvashi has also been seen by [place] [facility] for b12 injectionsbrFrida was doing a lot better after the HP was treated and cleared. Wasn't 100% so tried the xifaxin that seemed to make things worse. Bloating and tightness in upper abdomen is worse, affects breathing and feels like it is pulling. Some aching under ribs. Started back on nexium and align in the last 10 weeks.brThe nexium has helped the acid reflux symptoms. She is still having some acid at times and upper abdominal pressure.last week she had 2 episodes of blood in the stool and a bit when she wiped. I saw picutre and it was streaked on the outside.brInterval History:10/31/2022brSmagy did not have the HP stool test donebrweight is stable or up a bitbrShe delayed her HP stoolbrShe decreased marcos nexium from 40 to 20.Urvashi is taking the carafate and felt like it was helping but the nexium seemed to be cuasing some lower abdominal pain and when she stopped it in th elast couple dayus she felt better.br8/5 she had an iron infusion and she gets monthly b12 injections.brBM are normal.brswallowing is fine.Urvashi is seeing her GYN 12/1, gyn was not worried about the fibroid in her uterus.brInterval History:5/30/2023brvit D low *17) started on weeklybrHP stool negbrDExA wtih osteoporosisShe has been off nexium for 3 months and feels like overall better. Urvashi has intermittent bloating that is not as severe as when she had the H pylori, brHer vitamin D is in the normal range. She has been on the 50,000 IU, SHe tried back on the OTC and had a little stomach pain. Occasionally she had a little acid. Urvashi has had iron infusions nad monthly [id number] doesnt have a period since November. Urvashi is also seeing rheumatologybr    br  Plan from last visit:
br- continue once a week 50,000 iu vitamin Dbr- when abdomen bloats try taking peppermint and FD guard (delia). you can also take some gas-x and activate charcol if neededbr- stool testbr- we will get labs from your pcp (recent vitamin D)br- we will also request labs from [facility] (most recent) br- famotidine 40 mg 1-2 times a day as needed for reflux symptoms Interval History:  10/23/2023   
br
br   vitamin D normal *64)
br cbc normal 
br
Urvashi was doing well without any GERD medications but then in August symtpoms got worse.  Burning in upper abdomen, chest and throat.  Bloating and tightness and pain.  She always feels like the epigastric area also flares up.   she started on famotidine and she felt like it was helping initially.  Then she felt like she  was getting worse.  She weaned off it.  
br
br She has been better but still some upper abdominal soreness.  Occasional soreness and tightness.  
br She has some increased urinary frequency and some right flank pain as well.  She was told taht WBC were increased 
br
br BM are normal.  
br
br She is taking vitamin D 50,000 every 10 days, monthly b12 shots. 
br
br Seh is noting some increased sjogrens symtpsom where she will flare and get glandular tenderness, more dryness, discomfort in her chest.  Steroid injections have helped her (she gets steroids with her iron infusions) an dthe impact generally lasts about a month.  She would like a taper thembr
 My nurse has reviewed and updated the medication list with the patient (medication reconciliation). I have also reviewed the medication list. New updates were made to the patient's medical, social and family history. Pertinent details are also noted above in the HPI.br visited="true"

## 2023-10-23 NOTE — SERVICENOTES
Our goal is to partner with you to improve your health and well being. It is important for you to complete necessary testing and follow the instructions given to you at your clinic visit. Our office will call you within 2 weeks with results of any testing but you may also call sooner to obtain results [phone].   If you have any questions or concerns please feel free to call.  We take your care very seriously and we thank you for your trust!
- - when abdomen bloats try taking peppermint and FD guard (delia). you can also try rachels tea
- If you have a flare up you can go back on the famotidine. You may want to take 20 mg a day while on the prednisone
-Prednisone 10 mg take 3 pills for 3 days, then 2 pills for 3 days then 1 pill for 3 days then 1/2 pills for 3 days then stop.
- schedule CT 
- , Follow up as needed if symptoms are not improving or you have new or concerning symptoms.

## 2024-12-23 ENCOUNTER — APPOINTMENT (OUTPATIENT)
Dept: URBAN - METROPOLITAN AREA SURGERY 12 | Age: 54
Setting detail: DERMATOLOGY
End: 2024-12-23

## 2024-12-23 DIAGNOSIS — L82.1 OTHER SEBORRHEIC KERATOSIS: ICD-10-CM

## 2024-12-23 DIAGNOSIS — D18.0 HEMANGIOMA: ICD-10-CM

## 2024-12-23 DIAGNOSIS — L30.4 ERYTHEMA INTERTRIGO: ICD-10-CM

## 2024-12-23 DIAGNOSIS — L81.4 OTHER MELANIN HYPERPIGMENTATION: ICD-10-CM

## 2024-12-23 DIAGNOSIS — D22 MELANOCYTIC NEVI: ICD-10-CM

## 2024-12-23 PROBLEM — D22.5 MELANOCYTIC NEVI OF TRUNK: Status: ACTIVE | Noted: 2024-12-23

## 2024-12-23 PROBLEM — D18.01 HEMANGIOMA OF SKIN AND SUBCUTANEOUS TISSUE: Status: ACTIVE | Noted: 2024-12-23

## 2024-12-23 PROCEDURE — OTHER PRESCRIPTION MEDICATION MANAGEMENT: OTHER

## 2024-12-23 PROCEDURE — OTHER COUNSELING: OTHER

## 2024-12-23 PROCEDURE — OTHER PRESCRIPTION: OTHER

## 2024-12-23 PROCEDURE — OTHER MEDICATION COUNSELING: OTHER

## 2024-12-23 PROCEDURE — 99213 OFFICE O/P EST LOW 20 MIN: CPT

## 2024-12-23 RX ORDER — DESONIDE 0.5 MG/G
CREAM TOPICAL
Qty: 15 | Refills: 2 | Status: ACTIVE

## 2024-12-23 RX ORDER — NYSTATIN 100000 [USP'U]/G
POWDER TOPICAL BID-TID
Qty: 60 | Refills: 3 | Status: ACTIVE

## 2024-12-23 ASSESSMENT — LOCATION SIMPLE DESCRIPTION DERM
LOCATION SIMPLE: LEFT BREAST
LOCATION SIMPLE: ABDOMEN
LOCATION SIMPLE: CHEST

## 2024-12-23 ASSESSMENT — LOCATION DETAILED DESCRIPTION DERM
LOCATION DETAILED: XIPHOID
LOCATION DETAILED: LEFT INFRAMAMMARY CREASE (INNER QUADRANT)
LOCATION DETAILED: MIDDLE STERNUM
LOCATION DETAILED: LOWER STERNUM

## 2024-12-23 ASSESSMENT — LOCATION ZONE DERM: LOCATION ZONE: TRUNK

## 2024-12-23 NOTE — PROCEDURE: MEDICATION COUNSELING
Finasteride Male Counseling: Finasteride Counseling:  I discussed with the patient the risks of use of finasteride including but not limited to decreased libido, decreased ejaculate volume, gynecomastia, and depression. Women should not handle medication.  All of the patient's questions and concerns were addressed.
Winlevi Counseling:  I discussed with the patient the risks of topical clascoterone including but not limited to erythema, scaling, itching, and stinging. Patient voiced their understanding.
Elidel Counseling: Patient may experience a mild burning sensation during topical application. Elidel is not approved in children less than 2 years of age. There have been case reports of hematologic and skin malignancies in patients using topical calcineurin inhibitors although causality is questionable.
Xeljanz Counseling: I discussed with the patient the risks of Xeljanz therapy including increased risk of infection, liver issues, headache, diarrhea, or cold symptoms. Live vaccines should be avoided. They were instructed to call if they have any problems.
Erivedge Counseling- I discussed with the patient the risks of Erivedge including but not limited to nausea, vomiting, diarrhea, constipation, weight loss, changes in the sense of taste, decreased appetite, muscle spasms, and hair loss.  The patient verbalized understanding of the proper use and possible adverse effects of Erivedge.  All of the patient's questions and concerns were addressed.
Azithromycin Counseling:  I discussed with the patient the risks of azithromycin including but not limited to GI upset, allergic reaction, drug rash, diarrhea, and yeast infections.
Doxepin Counseling:  Patient advised that the medication is sedating and not to drive a car after taking this medication. Patient informed of potential adverse effects including but not limited to dry mouth, urinary retention, and blurry vision.  The patient verbalized understanding of the proper use and possible adverse effects of doxepin.  All of the patient's questions and concerns were addressed.
Spevigo Pregnancy And Lactation Text: The risk during pregnancy and breastfeeding is uncertain with this medication. This medication does cross the placenta. It is unknown if this medication is found in breast milk.
Doxepin Pregnancy And Lactation Text: This medication is Pregnancy Category C and it isn't known if it is safe during pregnancy. It is also excreted in breast milk and breast feeding isn't recommended.
Stelara Counseling:  I discussed with the patient the risks of ustekinumab including but not limited to immunosuppression, malignancy, posterior leukoencephalopathy syndrome, and serious infections.  The patient understands that monitoring is required including a PPD at baseline and must alert us or the primary physician if symptoms of infection or other concerning signs are noted.
Itraconazole Counseling:  I discussed with the patient the risks of itraconazole including but not limited to liver damage, nausea/vomiting, neuropathy, and severe allergy.  The patient understands that this medication is best absorbed when taken with acidic beverages such as non-diet cola or ginger ale.  The patient understands that monitoring is required including baseline LFTs and repeat LFTs at intervals.  The patient understands that they are to contact us or the primary physician if concerning signs are noted.
Soolantra Counseling: I discussed with the patients the risks of topial Soolantra. This is a medicine which decreases the number of mites and inflammation in the skin. You experience burning, stinging, eye irritation or allergic reactions.  Please call our office if you develop any problems from using this medication.
Gabapentin Counseling: I discussed with the patient the risks of gabapentin including but not limited to dizziness, somnolence, fatigue and ataxia.
Cellcept Counseling:  I discussed with the patient the risks of mycophenolate mofetil including but not limited to infection/immunosuppression, GI upset, hypokalemia, hypercholesterolemia, bone marrow suppression, lymphoproliferative disorders, malignancy, GI ulceration/bleed/perforation, colitis, interstitial lung disease, kidney failure, progressive multifocal leukoencephalopathy, and birth defects.  The patient understands that monitoring is required including a baseline creatinine and regular CBC testing. In addition, patient must alert us immediately if symptoms of infection or other concerning signs are noted.
Rifampin Pregnancy And Lactation Text: This medication is Pregnancy Category C and it isn't know if it is safe during pregnancy. It is also excreted in breast milk and should not be used if you are breast feeding.
Zepbound Counseling: I reviewed the possible side effects including: thyroid tumors, kidney disease, gallbladder disease, abdominal pain, constipation, diarrhea, nausea, vomiting and pancreatitis. Do not take this medication if you have a history or family history of multiple endocrine neoplasia syndrome type 2. Side effects reviewed, pt to contact office should one occur.
Opioid Pregnancy And Lactation Text: These medications can lead to premature delivery and should be avoided during pregnancy. These medications are also present in breast milk in small amounts.
Hyrimoz Pregnancy And Lactation Text: This medication is Pregnancy Category B and is considered safe during pregnancy. It is unknown if this medication is excreted in breast milk.
High Dose Vitamin A Pregnancy And Lactation Text: High dose vitamin A therapy is contraindicated during pregnancy and breast feeding.
Use Enhanced Medication Counseling?: No
Sarecycline Counseling: Patient advised regarding possible photosensitivity and discoloration of the teeth, skin, lips, tongue and gums.  Patient instructed to avoid sunlight, if possible.  When exposed to sunlight, patients should wear protective clothing, sunglasses, and sunscreen.  The patient was instructed to call the office immediately if the following severe adverse effects occur:  hearing changes, easy bruising/bleeding, severe headache, or vision changes.  The patient verbalized understanding of the proper use and possible adverse effects of sarecycline.  All of the patient's questions and concerns were addressed.
Finasteride Female Counseling: Finasteride Counseling:  I discussed with the patient the risks of use of finasteride including but not limited to decreased libido and sexual dysfunction. Explained the teratogenic nature of the medication and stressed the importance of not getting pregnant during treatment. All of the patient's questions and concerns were addressed.
Ilumya Counseling: I discussed with the patient the risks of tildrakizumab including but not limited to immunosuppression, malignancy, posterior leukoencephalopathy syndrome, and serious infections.  The patient understands that monitoring is required including a PPD at baseline and must alert us or the primary physician if symptoms of infection or other concerning signs are noted.
Cellcept Pregnancy And Lactation Text: This medication is Pregnancy Category D and isn't considered safe during pregnancy. It is unknown if this medication is excreted in breast milk.
Elidel Pregnancy And Lactation Text: This medication is Pregnancy Category C. It is unknown if this medication is excreted in breast milk.
Winlevi Pregnancy And Lactation Text: This medication is considered safe during pregnancy and breastfeeding.
Erivedge Pregnancy And Lactation Text: This medication is Pregnancy Category X and is absolutely contraindicated during pregnancy. It is unknown if it is excreted in breast milk.
Azithromycin Pregnancy And Lactation Text: This medication is considered safe during pregnancy and is also secreted in breast milk.
Xeltitusz Pregnancy And Lactation Text: This medication is Pregnancy Category D and is not considered safe during pregnancy.  The risk during breast feeding is also uncertain.
Bactrim Counseling:  I discussed with the patient the risks of sulfa antibiotics including but not limited to GI upset, allergic reaction, drug rash, diarrhea, dizziness, photosensitivity, and yeast infections.  Rarely, more serious reactions can occur including but not limited to aplastic anemia, agranulocytosis, methemoglobinemia, blood dyscrasias, liver or kidney failure, lung infiltrates or desquamative/blistering drug rashes.
Libtayo Counseling- I discussed with the patient the risks of Libtayo including but not limited to nausea, vomiting, diarrhea, and bone or muscle pain.  The patient verbalized understanding of the proper use and possible adverse effects of Libtayo.  All of the patient's questions and concerns were addressed.
Itraconazole Pregnancy And Lactation Text: This medication is Pregnancy Category C and it isn't know if it is safe during pregnancy. It is also excreted in breast milk.
Soolantra Pregnancy And Lactation Text: This medication is Pregnancy Category C. This medication is considered safe during breast feeding.
Zepbound Pregnancy And Lactation Text: The fetal risk of this medication is unknown and taking while pregnant is not recommended. It is unknown if this medication is present in breast milk.
Gabapentin Pregnancy And Lactation Text: This medication is Pregnancy Category C and isn't considered safe during pregnancy. It is excreted in breast milk.
Hydroxyzine Counseling: Patient advised that the medication is sedating and not to drive a car after taking this medication.  Patient informed of potential adverse effects including but not limited to dry mouth, urinary retention, and blurry vision.  The patient verbalized understanding of the proper use and possible adverse effects of hydroxyzine.  All of the patient's questions and concerns were addressed.
Glycopyrrolate Counseling:  I discussed with the patient the risks of glycopyrrolate including but not limited to skin rash, drowsiness, dry mouth, difficulty urinating, and blurred vision.
Finasteride Pregnancy And Lactation Text: This medication is absolutely contraindicated during pregnancy. It is unknown if it is excreted in breast milk.
Ketoconazole Counseling:   Patient counseled regarding improving absorption with orange juice.  Adverse effects include but are not limited to breast enlargement, headache, diarrhea, nausea, upset stomach, liver function test abnormalities, taste disturbance, and stomach pain.  There is a rare possibility of liver failure that can occur when taking ketoconazole. The patient understands that monitoring of LFTs may be required, especially at baseline. The patient verbalized understanding of the proper use and possible adverse effects of ketoconazole.  All of the patient's questions and concerns were addressed.
Cyclophosphamide Counseling:  I discussed with the patient the risks of cyclophosphamide including but not limited to hair loss, hormonal abnormalities, decreased fertility, abdominal pain, diarrhea, nausea and vomiting, bone marrow suppression and infection. The patient understands that monitoring is required while taking this medication.
Eucrisa Counseling: Patient may experience a mild burning sensation during topical application. Eucrisa is not approved in children less than 3 months of age.
VTAMA Counseling: I discussed with the patient that VTAMA is not for use in the eyes, mouth or mouth. They should call the office if they develop any signs of allergic reactions to VTAMA. The patient verbalized understanding of the proper use and possible adverse effects of VTAMA.  All of the patient's questions and concerns were addressed.
Ilumya Pregnancy And Lactation Text: The risk during pregnancy and breastfeeding is uncertain with this medication.
Sarecycline Pregnancy And Lactation Text: This medication is Pregnancy Category D and not consider safe during pregnancy. It is also excreted in breast milk.
Vtama Pregnancy And Lactation Text: It is unknown if this medication can cause problems during pregnancy and breastfeeding.
Oral Minoxidil Counseling- I discussed with the patient the risks of oral minoxidil including but not limited to shortness of breath, swelling of the feet or ankles, dizziness, lightheadedness, unwanted hair growth and allergic reaction.  The patient verbalized understanding of the proper use and possible adverse effects of oral minoxidil.  All of the patient's questions and concerns were addressed.
Eucrisa Pregnancy And Lactation Text: This medication has not been assigned a Pregnancy Risk Category but animal studies failed to show danger with the topical medication. It is unknown if the medication is excreted in breast milk.
Libtayo Pregnancy And Lactation Text: This medication is contraindicated in pregnancy and when breast feeding.
Topical Retinoid counseling:  Patient advised to apply a pea-sized amount only at bedtime and wait 30 minutes after washing their face before applying.  If too drying, patient may add a non-comedogenic moisturizer. The patient verbalized understanding of the proper use and possible adverse effects of retinoids.  All of the patient's questions and concerns were addressed.
Hydroxyzine Pregnancy And Lactation Text: This medication is not safe during pregnancy and should not be taken. It is also excreted in breast milk and breast feeding isn't recommended.
Taltz Counseling: I discussed with the patient the risks of ixekizumab including but not limited to immunosuppression, serious infections, worsening of inflammatory bowel disease and drug reactions.  The patient understands that monitoring is required including a PPD at baseline and must alert us or the primary physician if symptoms of infection or other concerning signs are noted.
Bactrim Pregnancy And Lactation Text: This medication is Pregnancy Category D and is known to cause fetal risk.  It is also excreted in breast milk.
Glycopyrrolate Pregnancy And Lactation Text: This medication is Pregnancy Category B and is considered safe during pregnancy. It is unknown if it is excreted breast milk.
Ketoconazole Pregnancy And Lactation Text: This medication is Pregnancy Category C and it isn't know if it is safe during pregnancy. It is also excreted in breast milk and breast feeding isn't recommended.
Aklief counseling:  Patient advised to apply a pea-sized amount only at bedtime and wait 30 minutes after washing their face before applying.  If too drying, patient may add a non-comedogenic moisturizer.  The most commonly reported side effects including irritation, redness, scaling, dryness, stinging, burning, itching, and increased risk of sunburn.  The patient verbalized understanding of the proper use and possible adverse effects of retinoids.  All of the patient's questions and concerns were addressed.
Cyclophosphamide Pregnancy And Lactation Text: This medication is Pregnancy Category D and it isn't considered safe during pregnancy. This medication is excreted in breast milk.
Tetracycline Counseling: Patient counseled regarding possible photosensitivity and increased risk for sunburn.  Patient instructed to avoid sunlight, if possible.  When exposed to sunlight, patients should wear protective clothing, sunglasses, and sunscreen.  The patient was instructed to call the office immediately if the following severe adverse effects occur:  hearing changes, easy bruising/bleeding, severe headache, or vision changes.  The patient verbalized understanding of the proper use and possible adverse effects of tetracycline.  All of the patient's questions and concerns were addressed. Patient understands to avoid pregnancy while on therapy due to potential birth defects.
Infliximab Counseling:  I discussed with the patient the risks of infliximab including but not limited to myelosuppression, immunosuppression, autoimmune hepatitis, demyelinating diseases, lymphoma, and serious infections.  The patient understands that monitoring is required including a PPD at baseline and must alert us or the primary physician if symptoms of infection or other concerning signs are noted.
Birth Control Pills Counseling: Birth Control Pill Counseling: I discussed with the patient the potential side effects of OCPs including but not limited to increased risk of stroke, heart attack, thrombophlebitis, deep venous thrombosis, hepatic adenomas, breast changes, GI upset, headaches, and depression.  The patient verbalized understanding of the proper use and possible adverse effects of OCPs. All of the patient's questions and concerns were addressed.
Birth Control Pills Pregnancy And Lactation Text: This medication should be avoided if pregnant and for the first 30 days post-partum.
Oral Minoxidil Pregnancy And Lactation Text: This medication should only be used when clearly needed if you are pregnant, attempting to become pregnant or breast feeding.
Zoryve Counseling:  I discussed with the patient that Zoryve is not for use in the eyes, mouth or vagina. The most commonly reported side effects include diarrhea, headache, insomnia, application site pain, upper respiratory tract infections, and urinary tract infections.  All of the patient's questions and concerns were addressed.
Odomzo Counseling- I discussed with the patient the risks of Odomzo including but not limited to nausea, vomiting, diarrhea, constipation, weight loss, changes in the sense of taste, decreased appetite, muscle spasms, and hair loss.  The patient verbalized understanding of the proper use and possible adverse effects of Odomzo.  All of the patient's questions and concerns were addressed.
Cephalexin Counseling: I counseled the patient regarding use of cephalexin as an antibiotic for prophylactic and/or therapeutic purposes. Cephalexin (commonly prescribed under brand name Keflex) is a cephalosporin antibiotic which is active against numerous classes of bacteria, including most skin bacteria. Side effects may include nausea, diarrhea, gastrointestinal upset, rash, hives, yeast infections, and in rare cases, hepatitis, kidney disease, seizures, fever, confusion, neurologic symptoms, and others. Patients with severe allergies to penicillin medications are cautioned that there is about a 10% incidence of cross-reactivity with cephalosporins. When possible, patients with penicillin allergies should use alternatives to cephalosporins for antibiotic therapy.
Hydroquinone Counseling:  Patient advised that medication may result in skin irritation, lightening (hypopigmentation), dryness, and burning.  In the event of skin irritation, the patient was advised to reduce the amount of the drug applied or use it less frequently.  Rarely, spots that are treated with hydroquinone can become darker (pseudoochronosis).  Should this occur, patient instructed to stop medication and call the office. The patient verbalized understanding of the proper use and possible adverse effects of hydroquinone.  All of the patient's questions and concerns were addressed.
Bexarotene Counseling:  I discussed with the patient the risks of bexarotene including but not limited to hair loss, dry lips/skin/eyes, liver abnormalities, hyperlipidemia, pancreatitis, depression/suicidal ideation, photosensitivity, drug rash/allergic reactions, hypothyroidism, anemia, leukopenia, infection, cataracts, and teratogenicity.  Patient understands that they will need regular blood tests to check lipid profile, liver function tests, white blood cell count, thyroid function tests and pregnancy test if applicable.
Qbrexza Pregnancy And Lactation Text: There is no available data on Qbrexza use in pregnant women.  There is no available data on Qbrexza use in lactation.
Enbrel Counseling:  I discussed with the patient the risks of etanercept including but not limited to myelosuppression, immunosuppression, autoimmune hepatitis, demyelinating diseases, lymphoma, and infections.  The patient understands that monitoring is required including a PPD at baseline and must alert us or the primary physician if symptoms of infection or other concerning signs are noted.
Detail Level: Simple
Olumiant Pregnancy And Lactation Text: Based on animal studies, Olumiant may cause embryo-fetal harm when administered to pregnant women.  The medication should not be used in pregnancy.  Breastfeeding is not recommended during treatment.
Cantharidin Counseling:  I discussed with the patient the risks of Cantharidin including but not limited to pain, redness, burning, itching, and blistering.
Topical Steroids Applications Pregnancy And Lactation Text: Most topical steroids are considered safe to use during pregnancy and lactation.  Any topical steroid applied to the breast or nipple should be washed off before breastfeeding.
Cimzia Pregnancy And Lactation Text: This medication crosses the placenta but can be considered safe in certain situations. Cimzia may be excreted in breast milk.
Minocycline Counseling: Patient advised regarding possible photosensitivity and discoloration of the teeth, skin, lips, tongue and gums.  Patient instructed to avoid sunlight, if possible.  When exposed to sunlight, patients should wear protective clothing, sunglasses, and sunscreen.  The patient was instructed to call the office immediately if the following severe adverse effects occur:  hearing changes, easy bruising/bleeding, severe headache, or vision changes.  The patient verbalized understanding of the proper use and possible adverse effects of minocycline.  All of the patient's questions and concerns were addressed.
Topical Sulfur Applications Counseling: Topical Sulfur Counseling: Patient counseled that this medication may cause skin irritation or allergic reactions.  In the event of skin irritation, the patient was advised to reduce the amount of the drug applied or use it less frequently.   The patient verbalized understanding of the proper use and possible adverse effects of topical sulfur application.  All of the patient's questions and concerns were addressed.
Albendazole Pregnancy And Lactation Text: This medication is Pregnancy Category C and it isn't known if it is safe during pregnancy. It is also excreted in breast milk.
5-Fu Counseling: 5-Fluorouracil Counseling:  I discussed with the patient the risks of 5-fluorouracil including but not limited to erythema, scaling, itching, weeping, crusting, and pain.
Rinvoq Counseling: I discussed with the patient the risks of Rinvoq therapy including but not limited to upper respiratory tract infections, shingles, cold sores, bronchitis, nausea, cough, fever, acne, and headache. Live vaccines should be avoided.  This medication has been linked to serious infections; higher rate of mortality; malignancy and lymphoproliferative disorders; major adverse cardiovascular events; thrombosis; thrombocytopenia, anemia, and neutropenia; lipid elevations; liver enzyme elevations; and gastrointestinal perforations.
Methotrexate Pregnancy And Lactation Text: This medication is Pregnancy Category X and is known to cause fetal harm. This medication is excreted in breast milk.
Skyrizi Counseling: I discussed with the patient the risks of risankizumab-rzaa including but not limited to immunosuppression, and serious infections.  The patient understands that monitoring is required including a PPD at baseline and must alert us or the primary physician if symptoms of infection or other concerning signs are noted.
Tranexamic Acid Counseling:  Patient advised of the small risk of bleeding problems with tranexamic acid. They were also instructed to call if they developed any nausea, vomiting or diarrhea. All of the patient's questions and concerns were addressed.
Rhofade Counseling: Rhofade is a topical medication which can decrease superficial blood flow where applied. Side effects are uncommon and include stinging, redness and allergic reactions.
Fluconazole Counseling:  Patient counseled regarding adverse effects of fluconazole including but not limited to headache, diarrhea, nausea, upset stomach, liver function test abnormalities, taste disturbance, and stomach pain.  There is a rare possibility of liver failure that can occur when taking fluconazole.  The patient understands that monitoring of LFTs and kidney function test may be required, especially at baseline. The patient verbalized understanding of the proper use and possible adverse effects of fluconazole.  All of the patient's questions and concerns were addressed.
Colchicine Counseling:  Patient counseled regarding adverse effects including but not limited to stomach upset (nausea, vomiting, stomach pain, or diarrhea).  Patient instructed to limit alcohol consumption while taking this medication.  Colchicine may reduce blood counts especially with prolonged use.  The patient understands that monitoring of kidney function and blood counts may be required, especially at baseline. The patient verbalized understanding of the proper use and possible adverse effects of colchicine.  All of the patient's questions and concerns were addressed.
Semaglutide Counseling: I reviewed the possible side effects including: thyroid tumors, kidney disease, gallbladder disease, abdominal pain, constipation, diarrhea, nausea, vomiting and pancreatitis. Do not take this medication if you have a history or family history of multiple endocrine neoplasia syndrome type 2. Side effects reviewed, pt to contact office should one occur.
Bexarotene Pregnancy And Lactation Text: This medication is Pregnancy Category X and should not be given to women who are pregnant or may become pregnant. This medication should not be used if you are breast feeding.
Dutasteride Male Counseling: Dustasteride Counseling:  I discussed with the patient the risks of use of dutasteride including but not limited to decreased libido, decreased ejaculate volume, and gynecomastia. Women who can become pregnant should not handle medication.  All of the patient's questions and concerns were addressed.
Humira Counseling:  I discussed with the patient the risks of adalimumab including but not limited to myelosuppression, immunosuppression, autoimmune hepatitis, demyelinating diseases, lymphoma, and serious infections.  The patient understands that monitoring is required including a PPD at baseline and must alert us or the primary physician if symptoms of infection or other concerning signs are noted.
Quinolones Counseling:  I discussed with the patient the risks of fluoroquinolones including but not limited to GI upset, allergic reaction, drug rash, diarrhea, dizziness, photosensitivity, yeast infections, liver function test abnormalities, tendonitis/tendon rupture.
Isotretinoin Counseling: Patient should get monthly blood tests, not donate blood, not drive at night if vision affected, not share medication, and not undergo elective surgery for 6 months after tx completed. Side effects reviewed, pt to contact office should one occur.
Topical Sulfur Applications Pregnancy And Lactation Text: This medication is considered safe during pregnancy and breast feeding secondary to limited systemic absorption.
Ivermectin Counseling:  Patient instructed to take medication on an empty stomach with a full glass of water.  Patient informed of potential adverse effects including but not limited to nausea, diarrhea, dizziness, itching, and swelling of the extremities or lymph nodes.  The patient verbalized understanding of the proper use and possible adverse effects of ivermectin.  All of the patient's questions and concerns were addressed.
5-Fu Pregnancy And Lactation Text: This medication is Pregnancy Category X and contraindicated in pregnancy and in women who may become pregnant. It is unknown if this medication is excreted in breast milk.
Rinvoq Pregnancy And Lactation Text: Based on animal studies, Rinvoq may cause embryo-fetal harm when administered to pregnant women.  The medication should not be used in pregnancy.  Breastfeeding is not recommended during treatment and for 6 days after the last dose.
Prednisone Counseling:  I discussed with the patient the risks of prolonged use of prednisone including but not limited to weight gain, insomnia, osteoporosis, mood changes, diabetes, susceptibility to infection, glaucoma and high blood pressure.  In cases where prednisone use is prolonged, patients should be monitored with blood pressure checks, serum glucose levels and an eye exam.  Additionally, the patient may need to be placed on GI prophylaxis, PCP prophylaxis, and calcium and vitamin D supplementation and/or a bisphosphonate.  The patient verbalized understanding of the proper use and the possible adverse effects of prednisone.  All of the patient's questions and concerns were addressed.
Cimetidine Counseling:  I discussed with the patient the risks of Cimetidine including but not limited to gynecomastia, headache, diarrhea, nausea, drowsiness, arrhythmias, pancreatitis, skin rashes, psychosis, bone marrow suppression and kidney toxicity.
Rhofade Pregnancy And Lactation Text: This medication has not been assigned a Pregnancy Risk Category. It is unknown if the medication is excreted in breast milk.
Tranexamic Acid Pregnancy And Lactation Text: It is unknown if this medication is safe during pregnancy or breast feeding.
Isotretinoin Pregnancy And Lactation Text: This medication is Pregnancy Category X and is considered extremely dangerous during pregnancy. It is unknown if it is excreted in breast milk.
Valtrex Counseling: I discussed with the patient the risks of valacyclovir including but not limited to kidney damage, nausea, vomiting and severe allergy.  The patient understands that if the infection seems to be worsening or is not improving, they are to call.
Griseofulvin Counseling:  I discussed with the patient the risks of griseofulvin including but not limited to photosensitivity, cytopenia, liver damage, nausea/vomiting and severe allergy.  The patient understands that this medication is best absorbed when taken with a fatty meal (e.g., ice cream or french fries).
Wartpeel Counseling:  I discussed with the patient the risks of Wartpeel including but not limited to erythema, scaling, itching, weeping, crusting, and pain.
Sotyktu Counseling:  I discussed the most common side effects of Sotyktu including: common cold, sore throat, sinus infections, cold sores, canker sores, folliculitis, and acne.  I also discussed more serious side effects of Sotyktu including but not limited to: serious allergic reactions; increased risk for infections such as TB; cancers such as lymphomas; rhabdomyolysis and elevated CPK; and elevated triglycerides and liver enzymes. 
Drysol Counseling:  I discussed with the patient the risks of drysol/aluminum chloride including but not limited to skin rash, itching, irritation, burning.
Dutasteride Female Counseling: Dutasteride Counseling:  I discussed with the patient the risks of use of dutasteride including but not limited to decreased libido and sexual dysfunction. Explained the teratogenic nature of the medication and stressed the importance of not getting pregnant during treatment. All of the patient's questions and concerns were addressed.
Prednisone Pregnancy And Lactation Text: This medication is Pregnancy Category C and it isn't know if it is safe during pregnancy. This medication is excreted in breast milk.
Spevigo Counseling: I discussed with the patient the risks of Spevigo including but not limited to fatigue, nasuea, vomiting, headache, pruritus, urinary tract infection, an infusion related reactions.  The patient understands that monitoring is required including screening for tuberculosis at baseline and yearly screening thereafter while continuing Spevigo therapy. They should contact us if symptoms of infection or other concerning signs are noted.
Drysol Pregnancy And Lactation Text: This medication is considered safe during pregnancy and breast feeding.
Solaraze Counseling:  I discussed with the patient the risks of Solaraze including but not limited to erythema, scaling, itching, weeping, crusting, and pain.
Dapsone Counseling: I discussed with the patient the risks of dapsone including but not limited to hemolytic anemia, agranulocytosis, rashes, methemoglobinemia, kidney failure, peripheral neuropathy, headaches, GI upset, and liver toxicity.  Patients who start dapsone require monitoring including baseline LFTs and weekly CBCs for the first month, then every month thereafter.  The patient verbalized understanding of the proper use and possible adverse effects of dapsone.  All of the patient's questions and concerns were addressed.
Wegovy Counseling: I reviewed the possible side effects including: thyroid tumors, kidney disease, gallbladder disease, abdominal pain, constipation, diarrhea, nausea, vomiting and pancreatitis. Do not take this medication if you have a history or family history of multiple endocrine neoplasia syndrome type 2. Side effects reviewed, pt to contact office should one occur.
Azathioprine Counseling:  I discussed with the patient the risks of azathioprine including but not limited to myelosuppression, immunosuppression, hepatotoxicity, lymphoma, and infections.  The patient understands that monitoring is required including baseline LFTs, Creatinine, possible TPMP genotyping and weekly CBCs for the first month and then every 2 weeks thereafter.  The patient verbalized understanding of the proper use and possible adverse effects of azathioprine.  All of the patient's questions and concerns were addressed.
Cimetidine Pregnancy And Lactation Text: This medication is Pregnancy Category B and is considered safe during pregnancy. It is also excreted in breast milk and breast feeding isn't recommended.
Dapsone Pregnancy And Lactation Text: This medication is Pregnancy Category C and is not considered safe during pregnancy or breast feeding.
Valtrex Pregnancy And Lactation Text: this medication is Pregnancy Category B and is considered safe during pregnancy. This medication is not directly found in breast milk but it's metabolite acyclovir is present.
Griseofulvin Pregnancy And Lactation Text: This medication is Pregnancy Category X and is known to cause serious birth defects. It is unknown if this medication is excreted in breast milk but breast feeding should be avoided.
Solaraze Pregnancy And Lactation Text: This medication is Pregnancy Category B and is considered safe. There is some data to suggest avoiding during the third trimester. It is unknown if this medication is excreted in breast milk.
Hyrimoz Counseling:  I discussed with the patient the risks of adalimumab including but not limited to myelosuppression, immunosuppression, autoimmune hepatitis, demyelinating diseases, lymphoma, and serious infections.  The patient understands that monitoring is required including a PPD at baseline and must alert us or the primary physician if symptoms of infection or other concerning signs are noted.
Sotyktu Pregnancy And Lactation Text: There is insufficient data to evaluate whether or not Sotyktu is safe to use during pregnancy.   It is not known if Sotyktu passes into breast milk and whether or not it is safe to use when breastfeeding.  
Opioid Counseling: I discussed with the patient the potential side effects of opioids including but not limited to addiction, altered mental status, and depression. I stressed avoiding alcohol, benzodiazepines, muscle relaxants and sleep aids unless specifically okayed by a physician. The patient verbalized understanding of the proper use and possible adverse effects of opioids. All of the patient's questions and concerns were addressed. They were instructed to flush the remaining pills down the toilet if they did not need them for pain.
Rifampin Counseling: I discussed with the patient the risks of rifampin including but not limited to liver damage, kidney damage, red-orange body fluids, nausea/vomiting and severe allergy.
Dutasteride Pregnancy And Lactation Text: This medication is absolutely contraindicated in women, especially during pregnancy and breast feeding. Feminization of male fetuses is possible if taking while pregnant.
High Dose Vitamin A Counseling: Side effects reviewed, pt to contact office should one occur.
Picato Counseling:  I discussed with the patient the risks of Picato including but not limited to erythema, scaling, itching, weeping, crusting, and pain.
Siliq Counseling:  I discussed with the patient the risks of Siliq including but not limited to new or worsening depression, suicidal thoughts and behavior, immunosuppression, malignancy, posterior leukoencephalopathy syndrome, and serious infections.  The patient understands that monitoring is required including a PPD at baseline and must alert us or the primary physician if symptoms of infection or other concerning signs are noted. There is also a special program designed to monitor depression which is required with Siliq.
Klisyri Pregnancy And Lactation Text: It is unknown if this medication can harm a developing fetus or if it is excreted in breast milk.
Propranolol Counseling:  I discussed with the patient the risks of propranolol including but not limited to low heart rate, low blood pressure, low blood sugar, restlessness and increased cold sensitivity. They should call the office if they experience any of these side effects.
Minoxidil Counseling: Minoxidil is a topical medication which can increase blood flow where it is applied. It is uncertain how this medication increases hair growth. Side effects are uncommon and include stinging and allergic reactions.
Propranolol Pregnancy And Lactation Text: This medication is Pregnancy Category C and it isn't known if it is safe during pregnancy. It is excreted in breast milk.
Topical Ketoconazole Pregnancy And Lactation Text: This medication is Pregnancy Category B and is considered safe during pregnancy. It is unknown if it is excreted in breast milk.
Cibinqo Pregnancy And Lactation Text: It is unknown if this medication will adversely affect pregnancy or breast feeding.  You should not take this medication if you are currently pregnant or planning a pregnancy or while breastfeeding.
Niacinamide Pregnancy And Lactation Text: These medications are considered safe during pregnancy.
Carac Counseling:  I discussed with the patient the risks of Carac including but not limited to erythema, scaling, itching, weeping, crusting, and pain.
Adbry Pregnancy And Lactation Text: It is unknown if this medication will adversely affect pregnancy or breast feeding.
Dupixent Counseling: I discussed with the patient the risks of dupilumab including but not limited to eye infection and irritation, cold sores, injection site reactions, worsening of asthma, allergic reactions and increased risk of parasitic infection.  Live vaccines should be avoided while taking dupilumab. Dupilumab will also interact with certain medications such as warfarin and cyclosporine. The patient understands that monitoring is required and they must alert us or the primary physician if symptoms of infection or other concerning signs are noted.
Erythromycin Counseling:  I discussed with the patient the risks of erythromycin including but not limited to GI upset, allergic reaction, drug rash, diarrhea, increase in liver enzymes, and yeast infections.
Nsaids Counseling: NSAID Counseling: I discussed with the patient that NSAIDs should be taken with food. Prolonged use of NSAIDs can result in the development of stomach ulcers.  Patient advised to stop taking NSAIDs if abdominal pain occurs.  The patient verbalized understanding of the proper use and possible adverse effects of NSAIDs.  All of the patient's questions and concerns were addressed.
Topical Metronidazole Counseling: Metronidazole is a topical antibiotic medication. You may experience burning, stinging, redness, or allergic reactions.  Please call our office if you develop any problems from using this medication.
Litfulo Counseling: I discussed with the patient the risks of Litfulo therapy including but not limited to upper respiratory tract infections, shingles, cold sores, and nausea. Live vaccines should be avoided.  This medication has been linked to serious infections; higher rate of mortality; malignancy and lymphoproliferative disorders; major adverse cardiovascular events; thrombosis; gastrointestinal perforations; neutropenia; lymphopenia; anemia; liver enzyme elevations; and lipid elevations.
SSKI Counseling:  I discussed with the patient the risks of SSKI including but not limited to thyroid abnormalities, metallic taste, GI upset, fever, headache, acne, arthralgias, paraesthesias, lymphadenopathy, easy bleeding, arrhythmias, and allergic reaction.
Arava Counseling:  Patient counseled regarding adverse effects of Arava including but not limited to nausea, vomiting, abnormalities in liver function tests. Patients may develop mouth sores, rash, diarrhea, and abnormalities in blood counts. The patient understands that monitoring is required including LFTs and blood counts.  There is a rare possibility of scarring of the liver and lung problems that can occur when taking methotrexate. Persistent nausea, loss of appetite, pale stools, dark urine, cough, and shortness of breath should be reported immediately. Patient advised to discontinue Arava treatment and consult with a physician prior to attempting conception. The patient will have to undergo a treatment to eliminate Arava from the body prior to conception.
Protopic Counseling: Patient may experience a mild burning sensation during topical application. Protopic is not approved in children less than 2 years of age. There have been case reports of hematologic and skin malignancies in patients using topical calcineurin inhibitors although causality is questionable.
Ozempic Counseling: I reviewed the possible side effects including: thyroid tumors, kidney disease, gallbladder disease, abdominal pain, constipation, diarrhea, nausea, vomiting and pancreatitis. Do not take this medication if you have a history or family history of multiple endocrine neoplasia syndrome type 2. Side effects reviewed, pt to contact office should one occur.
Erythromycin Pregnancy And Lactation Text: This medication is Pregnancy Category B and is considered safe during pregnancy. It is also excreted in breast milk.
Bimzelx Counseling:  I discussed with the patient the risks of Bimzelx including but not limited to depression, immunosuppression, allergic reactions and infections.  The patient understands that monitoring is required including a PPD at baseline and must alert us or the primary physician if symptoms of infection or other concerning signs are noted.
Dupixent Pregnancy And Lactation Text: This medication likely crosses the placenta but the risk for the fetus is uncertain. This medication is excreted in breast milk.
Acitretin Counseling:  I discussed with the patient the risks of acitretin including but not limited to hair loss, dry lips/skin/eyes, liver damage, hyperlipidemia, depression/suicidal ideation, photosensitivity.  Serious rare side effects can include but are not limited to pancreatitis, pseudotumor cerebri, bony changes, clot formation/stroke/heart attack.  Patient understands that alcohol is contraindicated since it can result in liver toxicity and significantly prolong the elimination of the drug by many years.
Topical Metronidazole Pregnancy And Lactation Text: This medication is Pregnancy Category B and considered safe during pregnancy.  It is also considered safe to use while breastfeeding.
Bimzelx Pregnancy And Lactation Text: This medication crosses the placenta and the safety is uncertain during pregnancy. It is unknown if this medication is present in breast milk.
Litfulo Pregnancy And Lactation Text: Based on animal studies, Lifulo may cause embryo-fetal harm when administered to pregnant women.  The medication should not be used in pregnancy.  Breastfeeding is not recommended during treatment.
Nsaids Pregnancy And Lactation Text: These medications are considered safe up to 30 weeks gestation. It is excreted in breast milk.
Calcipotriene Counseling:  I discussed with the patient the risks of calcipotriene including but not limited to erythema, scaling, itching, and irritation.
Simlandi Counseling:  I discussed with the patient the risks of adalimumab including but not limited to myelosuppression, immunosuppression, autoimmune hepatitis, demyelinating diseases, lymphoma, and serious infections.  The patient understands that monitoring is required including a PPD at baseline and must alert us or the primary physician if symptoms of infection or other concerning signs are noted.
Mirvaso Counseling: Mirvaso is a topical medication which can decrease superficial blood flow where applied. Side effects are uncommon and include stinging, redness and allergic reactions.
Protopic Pregnancy And Lactation Text: This medication is Pregnancy Category C. It is unknown if this medication is excreted in breast milk when applied topically.
Metronidazole Counseling:  I discussed with the patient the risks of metronidazole including but not limited to seizures, nausea/vomiting, a metallic taste in the mouth, nausea/vomiting and severe allergy.
Sski Pregnancy And Lactation Text: This medication is Pregnancy Category D and isn't considered safe during pregnancy. It is excreted in breast milk.
Ebglyss Counseling: I discussed with the patient the risks of lebrikizumab including but not limited to eye inflammation and irritation, cold sores, injection site reactions, allergic reactions and increased risk of parasitic infection. The patient understands that monitoring is required and they must alert us or the primary physician if symptoms of infection or other concerning signs are noted.
Metronidazole Pregnancy And Lactation Text: This medication is Pregnancy Category B and considered safe during pregnancy.  It is also excreted in breast milk.
Acitretin Pregnancy And Lactation Text: This medication is Pregnancy Category X and should not be given to women who are pregnant or may become pregnant in the future. This medication is excreted in breast milk.
Ebglyss Pregnancy And Lactation Text: This medication likely crosses the placenta but the risk for the fetus is uncertain. It is unknown if this medication is excreted in breast milk.
Olumiant Counseling: I discussed with the patient the risks of Olumiant therapy including but not limited to upper respiratory tract infections, shingles, cold sores, and nausea. Live vaccines should be avoided.  This medication has been linked to serious infections; higher rate of mortality; malignancy and lymphoproliferative disorders; major adverse cardiovascular events; thrombosis; gastrointestinal perforations; neutropenia; lymphopenia; anemia; liver enzyme elevations; and lipid elevations.
Thalidomide Counseling: I discussed with the patient the risks of thalidomide including but not limited to birth defects, anxiety, weakness, chest pain, dizziness, cough and severe allergy.
Topical Steroids Counseling: I discussed with the patient that prolonged use of topical steroids can result in the increased appearance of superficial blood vessels (telangiectasias), lightening (hypopigmentation) and thinning of the skin (atrophy).  Patient understands to avoid using high potency steroids in skin folds, the groin or the face.  The patient verbalized understanding of the proper use and possible adverse effects of topical steroids.  All of the patient's questions and concerns were addressed.
Olanzapine Counseling- I discussed with the patient the common side effects of olanzapine including but are not limited to: lack of energy, dry mouth, increased appetite, sleepiness, tremor, constipation, dizziness, changes in behavior, or restlessness.  Explained that teenagers are more likely to experience headaches, abdominal pain, pain in the arms or legs, tiredness, and sleepiness.  Serious side effects include but are not limited: increased risk of death in elderly patients who are confused, have memory loss, or dementia-related psychosis; hyperglycemia; increased cholesterol and triglycerides; and weight gain.
Cimzia Counseling:  I discussed with the patient the risks of Cimzia including but not limited to immunosuppression, allergic reactions and infections.  The patient understands that monitoring is required including a PPD at baseline and must alert us or the primary physician if symptoms of infection or other concerning signs are noted.
Calcipotriene Pregnancy And Lactation Text: The use of this medication during pregnancy or lactation is not recommended as there is insufficient data.
Methotrexate Counseling:  Patient counseled regarding adverse effects of methotrexate including but not limited to nausea, vomiting, abnormalities in liver function tests. Patients may develop mouth sores, rash, diarrhea, and abnormalities in blood counts. The patient understands that monitoring is required including LFT's and blood counts.  There is a rare possibility of scarring of the liver and lung problems that can occur when taking methotrexate. Persistent nausea, loss of appetite, pale stools, dark urine, cough, and shortness of breath should be reported immediately. Patient advised to discontinue methotrexate treatment at least three months before attempting to become pregnant.  I discussed the need for folate supplements while taking methotrexate.  These supplements can decrease side effects during methotrexate treatment. The patient verbalized understanding of the proper use and possible adverse effects of methotrexate.  All of the patient's questions and concerns were addressed.
Olanzapine Pregnancy And Lactation Text: This medication is pregnancy category C.   There are no adequate and well controlled trials with olanzapine in pregnant females.  Olanzapine should be used during pregnancy only if the potential benefit justifies the potential risk to the fetus.   In a study in lactating healthy women, olanzapine was excreted in breast milk.  It is recommended that women taking olanzapine should not breast feed.
Albendazole Counseling:  I discussed with the patient the risks of albendazole including but not limited to cytopenia, kidney damage, nausea/vomiting and severe allergy.  The patient understands that this medication is being used in an off-label manner.
Qbrexza Counseling:  I discussed with the patient the risks of Qbrexza including but not limited to headache, mydriasis, blurred vision, dry eyes, nasal dryness, dry mouth, dry throat, dry skin, urinary hesitation, and constipation.  Local skin reactions including erythema, burning, stinging, and itching can also occur.
Saxenda Counseling: I reviewed the possible side effects including: thyroid tumors, kidney disease, gallbladder disease, abdominal pain, constipation, diarrhea, nausea, vomiting and pancreatitis. Do not take this medication if you have a history or family history of multiple endocrine neoplasia syndrome type 2. Side effects reviewed, pt to contact office should one occur.
Clofazimine Counseling:  I discussed with the patient the risks of clofazimine including but not limited to skin and eye pigmentation, liver damage, nausea/vomiting, gastrointestinal bleeding and allergy.
Simponi Counseling:  I discussed with the patient the risks of golimumab including but not limited to myelosuppression, immunosuppression, autoimmune hepatitis, demyelinating diseases, lymphoma, and serious infections.  The patient understands that monitoring is required including a PPD at baseline and must alert us or the primary physician if symptoms of infection or other concerning signs are noted.
Tremfya Counseling: I discussed with the patient the risks of guselkumab including but not limited to immunosuppression, serious infections, and drug reactions.  The patient understands that monitoring is required including a PPD at baseline and must alert us or the primary physician if symptoms of infection or other concerning signs are noted.
Cephalexin Pregnancy And Lactation Text: This medication is Pregnancy Category B and considered safe during pregnancy.  It is also excreted in breast milk but can be used safely for shorter doses.
Tazorac Counseling:  Patient advised that medication is irritating and drying.  Patient may need to apply sparingly and wash off after an hour before eventually leaving it on overnight.  The patient verbalized understanding of the proper use and possible adverse effects of tazorac.  All of the patient's questions and concerns were addressed.
Hydroxychloroquine Counseling:  I discussed with the patient that a baseline ophthalmologic exam is needed at the start of therapy and every year thereafter while on therapy. A CBC may also be warranted for monitoring.  The side effects of this medication were discussed with the patient, including but not limited to agranulocytosis, aplastic anemia, seizures, rashes, retinopathy, and liver toxicity. Patient instructed to call the office should any adverse effect occur.  The patient verbalized understanding of the proper use and possible adverse effects of Plaquenil.  All the patient's questions and concerns were addressed.
Cyclosporine Counseling:  I discussed with the patient the risks of cyclosporine including but not limited to hypertension, gingival hyperplasia,myelosuppression, immunosuppression, liver damage, kidney damage, neurotoxicity, lymphoma, and serious infections. The patient understands that monitoring is required including baseline blood pressure, CBC, CMP, lipid panel and uric acid, and then 1-2 times monthly CMP and blood pressure.
Terbinafine Counseling: Patient counseling regarding adverse effects of terbinafine including but not limited to headache, diarrhea, rash, upset stomach, liver function test abnormalities, itching, taste/smell disturbance, nausea, abdominal pain, and flatulence.  There is a rare possibility of liver failure that can occur when taking terbinafine.  The patient understands that a baseline LFT and kidney function test may be required. The patient verbalized understanding of the proper use and possible adverse effects of terbinafine.  All of the patient's questions and concerns were addressed.
Aklief Pregnancy And Lactation Text: It is unknown if this medication is safe to use during pregnancy.  It is unknown if this medication is excreted in breast milk.  Breastfeeding women should use the topical cream on the smallest area of the skin for the shortest time needed while breastfeeding.  Do not apply to nipple and areola.
Nemluvio Counseling: I discussed with the patient the risks of nemolizumab including but not limited to headache, gastrointestinal complaints, nasopharyngitis, musculoskeletal complaints, injection site reactions, and allergic reactions. The patient understands that monitoring is required and they must alert us or the primary physician if any side effects are noted.
Spironolactone Counseling: Patient advised regarding risks of diarrhea, abdominal pain, hyperkalemia, birth defects (for female patients), liver toxicity and renal toxicity. The patient may need blood work to monitor liver and kidney function and potassium levels while on therapy. The patient verbalized understanding of the proper use and possible adverse effects of spironolactone.  All of the patient's questions and concerns were addressed.
Cantharidin Pregnancy And Lactation Text: This medication has not been proven safe during pregnancy. It is unknown if this medication is excreted in breast milk.
Otezla Counseling: The side effects of Otezla were discussed with the patient, including but not limited to worsening or new depression, weight loss, diarrhea, nausea, upper respiratory tract infection, and headache. Patient instructed to call the office should any adverse effect occur.  The patient verbalized understanding of the proper use and possible adverse effects of Otezla.  All the patient's questions and concerns were addressed.
Clindamycin Counseling: I counseled the patient regarding use of clindamycin as an antibiotic for prophylactic and/or therapeutic purposes. Clindamycin is active against numerous classes of bacteria, including skin bacteria. Side effects may include nausea, diarrhea, gastrointestinal upset, rash, hives, yeast infections, and in rare cases, colitis.
Otezla Pregnancy And Lactation Text: This medication is Pregnancy Category C and it isn't known if it is safe during pregnancy. It is unknown if it is excreted in breast milk.
Tazorac Pregnancy And Lactation Text: This medication is not safe during pregnancy. It is unknown if this medication is excreted in breast milk.
Hydroxychloroquine Pregnancy And Lactation Text: This medication has been shown to cause fetal harm but it isn't assigned a Pregnancy Risk Category. There are small amounts excreted in breast milk.
Azelaic Acid Counseling: Patient counseled that medicine may cause skin irritation and to avoid applying near the eyes.  In the event of skin irritation, the patient was advised to reduce the amount of the drug applied or use it less frequently.   The patient verbalized understanding of the proper use and possible adverse effects of azelaic acid.  All of the patient's questions and concerns were addressed.
Low Dose Naltrexone Counseling- I discussed with the patient the potential risks and side effects of low dose naltrexone including but not limited to: more vivid dreams, headaches, nausea, vomiting, abdominal pain, fatigue, dizziness, and anxiety.
Topical Clindamycin Counseling: Patient counseled that this medication may cause skin irritation or allergic reactions.  In the event of skin irritation, the patient was advised to reduce the amount of the drug applied or use it less frequently.   The patient verbalized understanding of the proper use and possible adverse effects of clindamycin.  All of the patient's questions and concerns were addressed.
Nemluvio Pregnancy And Lactation Text: It is not known if Nemluvio causes fetal harm or is present in breast milk. Please proceed with caution if patients who are pregnant or breastfeeding.
Zyclara Counseling:  I discussed with the patient the risks of imiquimod including but not limited to erythema, scaling, itching, weeping, crusting, and pain.  Patient understands that the inflammatory response to imiquimod is variable from person to person and was educated regarded proper titration schedule.  If flu-like symptoms develop, patient knows to discontinue the medication and contact us.
Imiquimod Counseling:  I discussed with the patient the risks of imiquimod including but not limited to erythema, scaling, itching, weeping, crusting, and pain.  Patient understands that the inflammatory response to imiquimod is variable from person to person and was educated regarded proper titration schedule.  If flu-like symptoms develop, patient knows to discontinue the medication and contact us.
Spironolactone Pregnancy And Lactation Text: This medication can cause feminization of the male fetus and should be avoided during pregnancy. The active metabolite is also found in breast milk.
Opzelura Counseling:  I discussed with the patient the risks of Opzelura including but not limited to nasopharngitis, bronchitis, ear infection, eosinophila, hives, diarrhea, folliculitis, tonsillitis, and rhinorrhea.  Taken orally, this medication has been linked to serious infections; higher rate of mortality; malignancy and lymphoproliferative disorders; major adverse cardiovascular events; thrombosis; thrombocytopenia, anemia, and neutropenia; and lipid elevations.
Oxybutynin Counseling:  I discussed with the patient the risks of oxybutynin including but not limited to skin rash, drowsiness, dry mouth, difficulty urinating, and blurred vision.
Clindamycin Pregnancy And Lactation Text: This medication can be used in pregnancy if certain situations. Clindamycin is also present in breast milk.
Xolair Counseling:  Patient informed of potential adverse effects including but not limited to fever, muscle aches, rash and allergic reactions.  The patient verbalized understanding of the proper use and possible adverse effects of Xolair.  All of the patient's questions and concerns were addressed.
Xolair Pregnancy And Lactation Text: This medication is Pregnancy Category B and is considered safe during pregnancy. This medication is excreted in breast milk.
Low Dose Naltrexone Pregnancy And Lactation Text: Naltrexone is pregnancy category C.  There have been no adequate and well-controlled studies in pregnant women.  It should be used in pregnancy only if the potential benefit justifies the potential risk to the fetus.   Limited data indicates that naltrexone is minimally excreted into breastmilk.
Benzoyl Peroxide Counseling: Patient counseled that medicine may cause skin irritation and bleach clothing.  In the event of skin irritation, the patient was advised to reduce the amount of the drug applied or use it less frequently.   The patient verbalized understanding of the proper use and possible adverse effects of benzoyl peroxide.  All of the patient's questions and concerns were addressed.
Rituxan Counseling:  I discussed with the patient the risks of Rituxan infusions. Side effects can include infusion reactions, severe drug rashes including mucocutaneous reactions, reactivation of latent hepatitis and other infections and rarely progressive multifocal leukoencephalopathy.  All of the patient's questions and concerns were addressed.
Rituxan Pregnancy And Lactation Text: This medication is Pregnancy Category C and it isn't know if it is safe during pregnancy. It is unknown if this medication is excreted in breast milk but similar antibodies are known to be excreted.
Opzelura Pregnancy And Lactation Text: There is insufficient data to evaluate drug-associated risk for major birth defects, miscarriage, or other adverse maternal or fetal outcomes.  There is a pregnancy registry that monitors pregnancy outcomes in pregnant persons exposed to the medication during pregnancy.  It is unknown if this medication is excreted in breast milk.  Do not breastfeed during treatment and for about 4 weeks after the last dose.
Doxycycline Counseling:  Patient counseled regarding possible photosensitivity and increased risk for sunburn.  Patient instructed to avoid sunlight, if possible.  When exposed to sunlight, patients should wear protective clothing, sunglasses, and sunscreen.  The patient was instructed to call the office immediately if the following severe adverse effects occur:  hearing changes, easy bruising/bleeding, severe headache, or vision changes.  The patient verbalized understanding of the proper use and possible adverse effects of doxycycline.  All of the patient's questions and concerns were addressed.
Klisyri Counseling:  I discussed with the patient the risks of Klisyri including but not limited to erythema, scaling, itching, weeping, crusting, and pain.
Cosentyx Counseling:  I discussed with the patient the risks of Cosentyx including but not limited to worsening of Crohn's disease, immunosuppression, allergic reactions and infections.  The patient understands that monitoring is required including a PPD at baseline and must alert us or the primary physician if symptoms of infection or other concerning signs are noted.
Doxycycline Pregnancy And Lactation Text: This medication is Pregnancy Category D and not consider safe during pregnancy. It is also excreted in breast milk but is considered safe for shorter treatment courses.
Adbry Counseling: I discussed with the patient the risks of tralokinumab including but not limited to eye infection and irritation, cold sores, injection site reactions, worsening of asthma, allergic reactions and increased risk of parasitic infection.  Live vaccines should be avoided while taking tralokinumab. The patient understands that monitoring is required and they must alert us or the primary physician if symptoms of infection or other concerning signs are noted.
Benzoyl Peroxide Pregnancy And Lactation Text: This medication is Pregnancy Category C. It is unknown if benzoyl peroxide is excreted in breast milk.
Topical Ketoconazole Counseling: Patient counseled that this medication may cause skin irritation or allergic reactions.  In the event of skin irritation, the patient was advised to reduce the amount of the drug applied or use it less frequently.   The patient verbalized understanding of the proper use and possible adverse effects of ketoconazole.  All of the patient's questions and concerns were addressed.
Niacinamide Counseling: I recommended taking niacin or niacinamide, also know as vitamin B3, twice daily. Recent evidence suggests that taking vitamin B3 (500 mg twice daily) can reduce the risk of actinic keratoses and non-melanoma skin cancers. Side effects of vitamin B3 include flushing and headache.
Cibinqo Counseling: I discussed with the patient the risks of Cibinqo therapy including but not limited to common cold, nausea, headache, cold sores, increased blood CPK levels, dizziness, UTIs, fatigue, acne, and vomitting. Live vaccines should be avoided.  This medication has been linked to serious infections; higher rate of mortality; malignancy and lymphoproliferative disorders; major adverse cardiovascular events; thrombosis; thrombocytopenia and lymphopenia; lipid elevations; and retinal detachment.

## 2024-12-23 NOTE — PROCEDURE: PRESCRIPTION MEDICATION MANAGEMENT
Render In Strict Bullet Format?: No
Initiate Treatment: Nystatin powder \\nDesonide
Detail Level: Zone

## 2025-01-09 ENCOUNTER — OFFICE (OUTPATIENT)
Dept: URBAN - METROPOLITAN AREA CLINIC 72 | Facility: CLINIC | Age: 55
End: 2025-01-09

## 2025-01-09 VITALS
SYSTOLIC BLOOD PRESSURE: 130 MMHG | WEIGHT: 118 LBS | HEART RATE: 66 BPM | HEIGHT: 60 IN | DIASTOLIC BLOOD PRESSURE: 90 MMHG

## 2025-01-09 DIAGNOSIS — K21.9 GASTRO-ESOPHAGEAL REFLUX DISEASE WITHOUT ESOPHAGITIS: ICD-10-CM

## 2025-01-09 DIAGNOSIS — R10.13 EPIGASTRIC PAIN: ICD-10-CM

## 2025-01-09 PROCEDURE — 99214 OFFICE O/P EST MOD 30 MIN: CPT | Performed by: INTERNAL MEDICINE

## 2025-01-09 RX ORDER — FAMOTIDINE 40 MG/1
TABLET, FILM COATED ORAL
Qty: 120 | Refills: 3 | Status: ACTIVE
Start: 2025-01-09

## 2025-01-09 NOTE — SERVICEHPINOTES
Ana Maria Hanley   is seen today for a follow-up visit.  
brinitially saw Mt 2/2021brShe reports beginning in September 2020, she had swelling in her neck and throat and acid reflux symptoms. In October 2020, that became severe and persistent on a daily basis. She would have acid reflux daily, she would have mid chest dysphagia daily. She would have episodes of epigastric and chest pain after eating. She saw Dr. Femi HAMMER. She also had salivary gland swelling. He ordered CT of her neck. He did a laryngoscopy and found signs of acid reflux. He treated her with omeprazole 20 mg daily and famotidine 40 mg daily. She's been on that about 12 weeks now. She is better. Her reflux is better. Her dysphagia is rare now. She did lose 30 pounds last year because she had changed to an autoimmune diet because of her Sjogren's. Since September, she has lost some more weight. She also reports history of H. pylori in 2005 and was treated. She also brings in a CT from 2010 which shows a right diaphragmatic hernia.She subsequently saw her in?4/2021 as reflux got worse after starting iron. When she stopped iron and started ppi and famotidine symptoms improved. She declined EGD.Interval History 10/2021brSMagy has had 2 bouts of this iit was October to april last year. feeling of throat swelling and trouble swelling and acid reflux. That cleared with medication (prilosec 20 and famotinde). Was dong really well. She remained on those then started iron and symptoms got really bad. Stopped iron and increase to nexium 40 and famotine 40 at night. She has some epigastric tenderness that feels like a tight knot or pulled muscle. She also has a RUQ pain and an US showed a fulid filled gb. It doesn't seem to be worse after eating but certain positions do seem to make it worse. The dysphagia issue is better no throat swelling. Hardly ever has heartburn.She has been told that her b12 and vitamin D was low. Her iron was also low. She still has periods. She lost quite a bit of weight in october when this started (10 lbs) she had gained it back but now lost it again. She was diagnosed with Sjogrens in 2005.She also notes episodic extreme bloating.brInterval History:1/31/2022brLabs show WILLIAM with sat 4%, ferritin of 3 as well as b12 deficiency to 133 and vitamin D deficiency to 24?[facility] referred her to heme/onc, they recommended IV iron and she received a dose as well as oral b12.Urvashi has also been getting b12 shots.brThe epigastric pain is better still there, feels like a muscle or knot, pain to the right is improved.Urvashi is still having a lot of bloating and tightness. That is after eating, that is maybe a bit better.Urvashi had one b12 shot last time with iron infusion. Repeated b12 and told it was too low still so they are going to start every 2 weeks shotbrALso told that WBC is a bit low so they are tracking that.Urvashi was constipated for quite a while and now that is a little better.Urvashi is taking 40 and famotidine 40, she is not having much acid at all.She is taking 1000 iu a day of vitamin D dailyFrida had a pelvic US not too long again in October, That was through her GYN at [facility]. She was told that she had a large fibroid. She follows Dr. Reyes, she did not have an endometrial biopsyweight has improved but Robles saw Dr. Hernández in 4/22 adn 6/22brhe HP was positivesbrshbecky was treated and follow up stool test was negativebrFrida was treated wtih xifaxin and did not feel that it helped.Urvashi has also been seen by [place] for b12 injectionsbrFrida was doing a lot better after the HP was treated and cleared. Wasn't 100% so tried the xifaxin that seemed to make things worse. Bloating and tightness in upper abdomen is worse, affects breathing and feels like it is pulling. Some aching under ribs. Started back on nexium and align in the last 10 weeks.brThe nexium has helped the acid reflux symptoms. She is still having some acid at times and upper abdominal pressure.last week she had 2 episodes of blood in the stool and a bit when she wiped. I saw picutre and it was streaked on the outside.brInterval History:10/31/2022brSmagy did not have the HP stool test donebrweight is stable or up a bitbrShe delayed her HP stoolbrShe decreased marcos nexium from 40 to 20.Urvashi is taking the carafate and felt like it was helping but the nexium seemed to be cuasing some lower abdominal pain and when she stopped it in th elast couple dayus she felt better.br8/5 she had an iron infusion and she gets monthly b12 injections.brBM are normal.brswallowing is fine.Urvashi is seeing her GYN 12/1, gyn was not worried about the fibroid in her uterus.brInterval History:5/30/2023brvit D low *17) started on weeklybrHP stool negbrDExA wtih osteoporosisShbecky has been off nexium for 3 months and feels like overall better.Urvashi has intermittent bloating that is not as severe as when she had the H pylori,brHer vitamin D is in the normal range. She has been on the 50,000 IU, SHe tried back on the OTC and had a little stomach pain. Occasionally she had a little acid.Urvashi has had iron infusions nad monthly a56grMdk doesnt have a period since November.Urvashi is also seeing rheumatologybrInterval History:10/23/2023vitamin D normal *64)brcb normalFrida was doing well without any GERD medications but then in August symtpoms got worse. Burning in upper abdomen, chest and throat. Bloating and tightness and pain. She always feels like the epigastric area also flares up. she started on famotidine and she felt like it was helping initially. Then she felt like she was getting worse. She weaned off it. She has been better but still some upper abdominal soreness. Occasional soreness and tightness. Urvashi has some increased urinary frequency and some right flank pain as well. She was told taht WBC were increased BM are normal. She is taking vitamin D 50,000 every 10 days, monthly b12 shots. Se is noting some increased sjogrens symtpsom where she will flare and get glandular tenderness, more dryness, discomfort in her chest. Steroid injections have helped her (she gets steroids with her iron infusions) an dthe impact generally lasts about a month. She would like a taper them    br  Plan from last visit:
br
br when abdomen bloats try taking peppermint and FD guard (delia). you can also try rachels teabr- If you have a flare up you can go back on the famotidine. You may want to take 20 mg a day while on the prednisonebr-Prednisone 10 mg take 3 pills for 3 days, then 2 pills for 3 days then 1 pill for 3 days then 1/2 pills for 3 days then stop.br- schedule CT br- , Follow up as needed if symptoms are not improving or you have new or concerning symptoms. Interval History:  1/9/2025   
br
br   she saw heme in 12/23 for b12 and WILLIAM but iron and blood counts were normal and b12 shots decreased to every 2 months.
br
brPatient reports a history of nasal infection with crusting and tenderness. After that the patient developed stomach pain, acid reflux, and throat swelling. Initially treated with famotidine 20 mg daily and Prilosec, which caused gastric discomfort. Currently taking famotidine 40 mg BID with occasional acid reflux. The famotidine 40 twice a day has really helped.  She is still having symptoms though. Patient experiences occasional right-sided abdominal pain and bloating, which has improved. Reports stomach sensitivity and notes that bending down can precipitate pain and acid reflux.Patient has a history of Sjogren's syndrome and recent doxycycline use, which may have contributed to possible pill esophagitis. Previous CT in November 2023 and EGD in 2021 were reportedly normal. Patient is currently taking probiotics following antibiotic treatment and reports good B12 levels with sublingual supplementation  (she was taking sublingual and was told she had a high vitamin b12 so stopped it). . Vitamin D supplementation is ongoing at 50,000 IU every 10 days.My nurse has reviewed and updated the medication list with the patient (medication reconciliation). I have also reviewed the medication list. New updates were made to the patient's medical, social and family history. Pertinent details are also noted above in the HPI.br visited="true"

## 2025-01-09 NOTE — SERVICENOTES
Our goal is to partner with you to improve your health and well being. It is important for you to complete necessary testing and follow the instructions given to you at your clinic visit. Our office will call you within 2 weeks with results of any testing but you may also call sooner to obtain results [phone].   If you have any questions or concerns please feel free to call.  We take your care very seriously and we thank you for your trust!
- take voquenza once a day till complete then go back to famotidine 40 mg twice a day, as you are feeling better you can decrease to 20 and to once a day
- if you aren't feeling better in 2 weeks then let me know as I will get an upper endoscopy. 
- If you are having symptoms then dissolve one of the sucralfate in an ounch of water and use that up to 4 times a day.  Seperate from other meds by 2 hours.
- , Follow up as needed if symptoms are not improving or you have new or concerning symptoms.

## 2025-03-31 ENCOUNTER — OFFICE (OUTPATIENT)
Dept: URBAN - METROPOLITAN AREA CLINIC 72 | Facility: CLINIC | Age: 55
End: 2025-03-31

## 2025-03-31 VITALS
HEART RATE: 82 BPM | HEIGHT: 60 IN | DIASTOLIC BLOOD PRESSURE: 69 MMHG | WEIGHT: 104 LBS | SYSTOLIC BLOOD PRESSURE: 118 MMHG

## 2025-03-31 DIAGNOSIS — M35.00 SJOGREN SYNDROME, UNSPECIFIED: ICD-10-CM

## 2025-03-31 DIAGNOSIS — R10.13 EPIGASTRIC PAIN: ICD-10-CM

## 2025-03-31 DIAGNOSIS — E55.9 VITAMIN D DEFICIENCY, UNSPECIFIED: ICD-10-CM

## 2025-03-31 DIAGNOSIS — K21.9 GASTRO-ESOPHAGEAL REFLUX DISEASE WITHOUT ESOPHAGITIS: ICD-10-CM

## 2025-03-31 DIAGNOSIS — R13.19 OTHER DYSPHAGIA: ICD-10-CM

## 2025-03-31 PROCEDURE — 99214 OFFICE O/P EST MOD 30 MIN: CPT | Performed by: INTERNAL MEDICINE

## 2025-03-31 NOTE — SERVICEHPINOTES
Ana Maria Hanley   is seen today for a follow-up visit.  
br
initially saw Mt 2/2021brShe reports beginning in September 2020, she had swelling in her neck and throat and acid reflux symptoms. In October 2020, that became severe and persistent on a daily basis. She would have acid reflux daily, she would have mid chest dysphagia daily. She would have episodes of epigastric and chest pain after eating. She saw Dr. Femi HAMMER. She also had salivary gland swelling. He ordered CT of her neck. He did a laryngoscopy and found signs of acid reflux. He treated her with omeprazole 20 mg daily and famotidine 40 mg daily. She's been on that about 12 weeks now. She is better. Her reflux is better. Her dysphagia is rare now. She did lose 30 pounds last year because she had changed to an autoimmune diet because of her Sjogren's. Since September, she has lost some more weight. She also reports history of H. pylori in 2005 and was treated. She also brings in a CT from 2010 which shows a right diaphragmatic hernia.She subsequently saw her in?4/2021 as reflux got worse after starting iron. When she stopped iron and started ppi and famotidine symptoms improved. She declined EGD.Interval History 10/2021brSMagy has had 2 bouts of this iit was October to april last year. feeling of throat swelling and trouble swelling and acid reflux. That cleared with medication (prilosec 20 and famotinde). Was dong really well. She remained on those then started iron and symptoms got really bad. Stopped iron and increase to nexium 40 and famotine 40 at night. She has some epigastric tenderness that feels like a tight knot or pulled muscle. She also has a RUQ pain and an US showed a fulid filled gb. It doesn't seem to be worse after eating but certain positions do seem to make it worse. The dysphagia issue is better no throat swelling. Hardly ever has heartburn.She has been told that her b12 and vitamin D was low. Her iron was also low. She still has periods. She lost quite a bit of weight in october when this started (10 lbs) she had gained it back but now lost it again. She was diagnosed with Sjogrens in 2005.She also notes episodic extreme bloating.brInterval History:1/31/2022brLabs show WILLIAM with sat 4%, ferritin of 3 as well as b12 deficiency to 133 and vitamin D deficiency to 24?[facility] referred her to heme/onc, they recommended IV iron and she received a dose as well as oral b12.Urvashi has also been getting b12 shots.brThe epigastric pain is better still there, feels like a muscle or knot, pain to the right is improved.Urvashi is still having a lot of bloating and tightness. That is after eating, that is maybe a bit better.Urvashi had one b12 shot last time with iron infusion. Repeated b12 and told it was too low still so they are going to start every 2 weeks shotbrALso told that WBC is a bit low so they are tracking that.Urvashi was constipated for quite a while and now that is a little better.Urvashi is taking 40 and famotidine 40, she is not having much acid at all.She is taking 1000 iu a day of vitamin D dailyFrida had a pelvic US not too long again in October, That was through her GYN at [facility]. She was told that she had a large fibroid. She follows Dr. Reyes, she did not have an endometrial biopsyweight has improved but Robles saw Dr. Hernández in 4/22 adn 6/22brhe HP was positivesbrshbecky was treated and follow up stool test was negativebrFrida was treated wtih xifaxin and did not feel that it helped.Urvashi has also been seen by [place] for b12 injectionsbrFrida was doing a lot better after the HP was treated and cleared. Wasn't 100% so tried the xifaxin that seemed to make things worse. Bloating and tightness in upper abdomen is worse, affects breathing and feels like it is pulling. Some aching under ribs. Started back on nexium and align in the last 10 weeks.brThe nexium has helped the acid reflux symptoms. She is still having some acid at times and upper abdominal pressure.last week she had 2 episodes of blood in the stool and a bit when she wiped. I saw picutre and it was streaked on the outside.brInterval History:10/31/2022brSmagy did not have the HP stool test donebrweight is stable or up a bitbrShe delayed her HP stoolbrShe decreased marcos nexium from 40 to 20.Urvashi is taking the carafate and felt like it was helping but the nexium seemed to be cuasing some lower abdominal pain and when she stopped it in th elast couple dayus she felt better.br8/5 she had an iron infusion and she gets monthly b12 injections.brBM are normal.brswallowing is fine.Urvashi is seeing her GYN 12/1, gyn was not worried about the fibroid in her uterus.brInterval History:5/30/2023brvit D low *17) started on weeklybrHP stool negbrDExA wtih osteoporosisShbecky has been off nexium for 3 months and feels like overall better.Urvashi has intermittent bloating that is not as severe as when she had the H pylori,brHer vitamin D is in the normal range. She has been on the 50,000 IU, SHe tried back on the OTC and had a little stomach pain. Occasionally she had a little acid.Urvashi has had iron infusions nad monthly [id number] doesnt have a period since November.Urvashi is also seeing rheumatologybrInterval History:10/23/2023vitamin D normal *64)[facility] normalFrida was doing well without any GERD medications but then in August symtpoms got worse. Burning in upper abdomen, chest and throat. Bloating and tightness and pain. She always feels like the epigastric area also flares up. she started on famotidine and she felt like it was helping initially. Then she felt like she was getting worse. She weaned off it.She has been better but still some upper abdominal soreness. Occasional soreness and tightness.Urvashi has some increased urinary frequency and some right flank pain as well. She was told taht WBC were increasedBM are normal.She is taking vitamin D 50,000 every 10 days, monthly b12 shots.[facility] is noting some increased sjogrens symtpsom where she will flare and get glandular tenderness, more dryness, discomfort in her chest. Steroid injections have helped her (she gets steroids with her iron infusions) an dthe impact generally lasts about a month. She would like a taper themInterval History:1/9/2025frida saw heme in 12/23 for b12 and WILLIAM but iron and blood counts were normal and b12 shots decreased to every 2 months.Patient reports a history of nasal infection with crusting and tenderness. After that the patient developed stomach pain, acid reflux, and throat swelling. Initially treated with famotidine 20 mg daily and Prilosec, which caused gastric discomfort. Currently taking famotidine 40 mg BID with occasional acid reflux. The famotidine 40 twice a day has really helped. She is still having symptoms though. Patient experiences occasional right-sided abdominal pain and bloating, which has improved. Reports stomach sensitivity and notes that bending down can precipitate pain and acid reflux.Patient has a history of Sjogren's syndrome and recent doxycycline use, which may have contributed to possible pill esophagitis. Previous CT in November 2023 and EGD in 2021 were reportedly normal. Patient is currently taking probiotics following antibiotic treatment and reports good B12 levels with sublingual supplementation (she was taking sublingual and was told she had a high vitamin b12 so stopped it).. Vitamin D supplementation is ongoing at 50,000 IU every 10 days.    br  Plan from last visit:
br
br- take voquenza once a day till complete then go back to famotidine 40 mg twice a day, as you are feeling better you can decrease to 20 and to once a daybr- if you aren't feeling better in 2 weeks then let me know as I will get an upper endoscopy. br- If you are having symptoms then dissolve one of the sucralfate in an ounch of water and use that up to 4 times a day. Seperate from other meds by 2 hours.br- , Follow up as needed if symptoms are not improving or you have new or concerning symptoms. Interval History:  3/31/2025   
br   she left message and noted worsening reflux despite famotidine. 
br She did not take voquenza due to cost. 
br I did a trial of pantoprazole 40
br I reviewed heme/onc note and labs (treated for history of anemia)
br She has completed 4 weeks of pantoprazole.  She felt like it slowly helped.  She was taking famotidine 40 at night.  SHe has dropped it to 20.  
bron days that it doesnt work there is heartburn, burning in the upper abdomen, tingling in the throat.  This has been worse then ever. It started wtih marcos antibiotics.  
br
br SHe has also noted some dysphagia.  She also notes dry mouth from the pilocarpine.  My nurse has reviewed and updated the medication list with the patient (medication reconciliation). I have also reviewed the medication list. New updates were made to the patient's medical, social and family history. Pertinent details are also noted above in the HPI.br visited="true"

## 2025-03-31 NOTE — SERVICENOTES
Our goal is to partner with you to improve your health and well being. It is important for you to complete necessary testing and follow the instructions given to you at your clinic visit. Our office will call you within 2 weeks with results of any testing but you may also call sooner to obtain results [phone].   If you have any questions or concerns please feel free to call.  We take your care very seriously and we thank you for your trust!
- vitamin D3 do 1 pill three times a week  (of the sublingual 10Kiu)
- I would like to get EGD with biopsy for eoe and BIOPSY FOR autoimmune gastritis.  If you are able to wean off pantoprazole and famotidne for a week then we will check pH as well. Please let me know at the time of the EGD.- If you are unable to stop the Pantoprazole and famotidine for a week, it is OK
- You can also try reflux raft/reflux gourmet which are OTC alginate treatment for reflux symtpoms.